# Patient Record
Sex: MALE | Race: WHITE | NOT HISPANIC OR LATINO | Employment: FULL TIME | URBAN - METROPOLITAN AREA
[De-identification: names, ages, dates, MRNs, and addresses within clinical notes are randomized per-mention and may not be internally consistent; named-entity substitution may affect disease eponyms.]

---

## 2017-01-30 ENCOUNTER — HOSPITAL ENCOUNTER (OUTPATIENT)
Dept: LAB | Facility: MEDICAL CENTER | Age: 47
End: 2017-01-30
Attending: PHYSICIAN ASSISTANT
Payer: COMMERCIAL

## 2017-01-30 DIAGNOSIS — I10 ESSENTIAL HYPERTENSION: ICD-10-CM

## 2017-01-30 DIAGNOSIS — E03.9 HYPOTHYROIDISM, UNSPECIFIED TYPE: ICD-10-CM

## 2017-01-30 DIAGNOSIS — Z13.220 SCREENING FOR HYPERLIPIDEMIA: ICD-10-CM

## 2017-01-30 LAB
ALBUMIN SERPL BCP-MCNC: 4.3 G/DL (ref 3.2–4.9)
ALBUMIN/GLOB SERPL: 1.5 G/DL
ALP SERPL-CCNC: 49 U/L (ref 30–99)
ALT SERPL-CCNC: 18 U/L (ref 2–50)
ANION GAP SERPL CALC-SCNC: 7 MMOL/L (ref 0–11.9)
AST SERPL-CCNC: 21 U/L (ref 12–45)
BASOPHILS # BLD AUTO: 0.03 K/UL (ref 0–0.12)
BASOPHILS NFR BLD AUTO: 0.6 % (ref 0–1.8)
BILIRUB SERPL-MCNC: 1.3 MG/DL (ref 0.1–1.5)
BUN SERPL-MCNC: 21 MG/DL (ref 8–22)
CALCIUM SERPL-MCNC: 9.4 MG/DL (ref 8.5–10.5)
CHLORIDE SERPL-SCNC: 104 MMOL/L (ref 96–112)
CHOLEST SERPL-MCNC: 262 MG/DL (ref 100–199)
CO2 SERPL-SCNC: 26 MMOL/L (ref 20–33)
CREAT SERPL-MCNC: 1.28 MG/DL (ref 0.5–1.4)
EOSINOPHIL # BLD: 0.1 K/UL (ref 0–0.51)
EOSINOPHIL NFR BLD AUTO: 1.9 % (ref 0–6.9)
ERYTHROCYTE [DISTWIDTH] IN BLOOD BY AUTOMATED COUNT: 41.3 FL (ref 35.9–50)
GLOBULIN SER CALC-MCNC: 2.9 G/DL (ref 1.9–3.5)
GLUCOSE SERPL-MCNC: 94 MG/DL (ref 65–99)
HCT VFR BLD AUTO: 44.3 % (ref 42–52)
HDLC SERPL-MCNC: 62 MG/DL
HGB BLD-MCNC: 14.7 G/DL (ref 14–18)
IMM GRANULOCYTES # BLD AUTO: 0.01 K/UL (ref 0–0.11)
IMM GRANULOCYTES NFR BLD AUTO: 0.2 % (ref 0–0.9)
LDLC SERPL CALC-MCNC: 177 MG/DL
LYMPHOCYTES # BLD: 1.99 K/UL (ref 1–4.8)
LYMPHOCYTES NFR BLD AUTO: 37.8 % (ref 22–41)
MCH RBC QN AUTO: 31.4 PG (ref 27–33)
MCHC RBC AUTO-ENTMCNC: 33.2 G/DL (ref 33.7–35.3)
MCV RBC AUTO: 94.7 FL (ref 81.4–97.8)
MONOCYTES # BLD: 0.44 K/UL (ref 0–0.85)
MONOCYTES NFR BLD AUTO: 8.3 % (ref 0–13.4)
NEUTROPHILS # BLD: 2.7 K/UL (ref 1.82–7.42)
NEUTROPHILS NFR BLD AUTO: 51.2 % (ref 44–72)
NRBC # BLD AUTO: 0 K/UL
NRBC BLD-RTO: 0 /100 WBC
PLATELET # BLD AUTO: 225 K/UL (ref 164–446)
PMV BLD AUTO: 12 FL (ref 9–12.9)
POTASSIUM SERPL-SCNC: 4.1 MMOL/L (ref 3.6–5.5)
PROT SERPL-MCNC: 7.2 G/DL (ref 6–8.2)
RBC # BLD AUTO: 4.68 M/UL (ref 4.7–6.1)
SODIUM SERPL-SCNC: 137 MMOL/L (ref 135–145)
T4 FREE SERPL-MCNC: 0.92 NG/DL (ref 0.53–1.43)
TRIGL SERPL-MCNC: 113 MG/DL (ref 0–149)
TSH SERPL DL<=0.005 MIU/L-ACNC: 5.32 UIU/ML (ref 0.3–3.7)
WBC # BLD AUTO: 5.3 K/UL (ref 4.8–10.8)

## 2017-01-30 PROCEDURE — 36415 COLL VENOUS BLD VENIPUNCTURE: CPT

## 2017-01-30 PROCEDURE — 84443 ASSAY THYROID STIM HORMONE: CPT

## 2017-01-30 PROCEDURE — 80061 LIPID PANEL: CPT

## 2017-01-30 PROCEDURE — 84439 ASSAY OF FREE THYROXINE: CPT

## 2017-01-30 PROCEDURE — 80053 COMPREHEN METABOLIC PANEL: CPT

## 2017-01-30 PROCEDURE — 85025 COMPLETE CBC W/AUTO DIFF WBC: CPT

## 2017-01-31 ENCOUNTER — TELEPHONE (OUTPATIENT)
Dept: MEDICAL GROUP | Facility: MEDICAL CENTER | Age: 47
End: 2017-01-31

## 2017-01-31 NOTE — TELEPHONE ENCOUNTER
----- Message from Naty Trejo PA-C sent at 1/31/2017  6:47 AM PST -----  Please ask pt to make a non-urgent appt to discuss labs and to f/u on his blood pressure.   (long appt- we will discuss cholesterol, thyroid, HTN, and CBC)    Thank you  Naty

## 2017-02-02 ENCOUNTER — PATIENT MESSAGE (OUTPATIENT)
Dept: MEDICAL GROUP | Facility: MEDICAL CENTER | Age: 47
End: 2017-02-02

## 2017-02-02 DIAGNOSIS — E03.9 HYPOTHYROIDISM, UNSPECIFIED TYPE: ICD-10-CM

## 2017-02-02 DIAGNOSIS — I10 ESSENTIAL HYPERTENSION: ICD-10-CM

## 2017-02-03 RX ORDER — LISINOPRIL AND HYDROCHLOROTHIAZIDE 12.5; 1 MG/1; MG/1
0.5 TABLET ORAL DAILY
Qty: 45 TAB | Refills: 1 | Status: SHIPPED | OUTPATIENT
Start: 2017-02-03 | End: 2017-08-04 | Stop reason: SDUPTHER

## 2017-02-03 RX ORDER — LEVOTHYROXINE SODIUM 88 UG/1
88 TABLET ORAL
Qty: 90 TAB | Refills: 1 | Status: SHIPPED | OUTPATIENT
Start: 2017-02-03 | End: 2017-08-04 | Stop reason: SDUPTHER

## 2017-02-06 ENCOUNTER — OFFICE VISIT (OUTPATIENT)
Dept: MEDICAL GROUP | Facility: MEDICAL CENTER | Age: 47
End: 2017-02-06
Payer: COMMERCIAL

## 2017-02-06 VITALS
TEMPERATURE: 97.9 F | OXYGEN SATURATION: 97 % | DIASTOLIC BLOOD PRESSURE: 82 MMHG | SYSTOLIC BLOOD PRESSURE: 124 MMHG | WEIGHT: 232.37 LBS | RESPIRATION RATE: 16 BRPM | BODY MASS INDEX: 30.8 KG/M2 | HEART RATE: 62 BPM | HEIGHT: 73 IN

## 2017-02-06 DIAGNOSIS — E03.9 HYPOTHYROIDISM, UNSPECIFIED TYPE: ICD-10-CM

## 2017-02-06 DIAGNOSIS — E66.9 OBESITY (BMI 30-39.9): ICD-10-CM

## 2017-02-06 DIAGNOSIS — E78.5 HYPERLIPIDEMIA, UNSPECIFIED HYPERLIPIDEMIA TYPE: ICD-10-CM

## 2017-02-06 PROCEDURE — 99214 OFFICE O/P EST MOD 30 MIN: CPT | Performed by: PHYSICIAN ASSISTANT

## 2017-02-06 RX ORDER — SIMVASTATIN 10 MG
10 TABLET ORAL EVERY EVENING
Qty: 30 TAB | Refills: 3 | Status: SHIPPED | OUTPATIENT
Start: 2017-02-06 | End: 2017-06-03 | Stop reason: SDUPTHER

## 2017-02-06 ASSESSMENT — PAIN SCALES - GENERAL: PAINLEVEL: NO PAIN

## 2017-02-06 NOTE — PROGRESS NOTES
Subjective:     Chief Complaint   Patient presents with   • Follow-Up     labs     Dannie Boggs is a 46 y.o. male here today for hypothyroidism, cholesterol issues as listed below    Hypothyroidism  Dx around 10 years.   Treating with levothyroxine 88mcg qd  Results for DANNIE BOGGS (MRN [id number]) as of 2/6/2017 08:36   Ref. Range 1/30/2017 06:35   TSH Latest Ref Range: 0.300-3.700 uIU/mL 5.320 (H)   Free T-4 Latest Ref Range: 0.53-1.43 ng/dL 0.92   Has a veggie heavy diet- typically has looser stools.   Denies diarrhea, constipation, intolerance to hot/cold, rash, palpitation.    Hyperlipidemia  Has been taking simvastatin and past.    Results for DANNIE BOGGS (MRN [id number]) as of 2/6/2017 08:36   Ref. Range 1/30/2017 06:35   Cholesterol,Tot Latest Ref Range: 100-199 mg/dL 262 (H)   Triglycerides Latest Ref Range: 0-149 mg/dL 113   HDL Latest Ref Range: >=40 mg/dL 62   LDL Latest Ref Range: <100 mg/dL 177 (H)   Although is doing really well on diet and exercise and longer needed this.  Stated he still has a very healthy diet mostly vegetarian, hamburger maybe once monthly, never goes out to eat.  Exercises 5 days weekly including cardio  No tobacco use  Rare EtOH use  Family history of hyperlipidemia  Denies chest pain, shortness of breath, lightheadedness, muscle cramping       Current medicines (including changes today)  Current Outpatient Prescriptions   Medication Sig Dispense Refill   • simvastatin (ZOCOR) 10 MG Tab Take 1 Tab by mouth every evening. 30 Tab 3   • lisinopril-hydrochlorothiazide (PRINZIDE, ZESTORETIC) 10-12.5 MG per tablet Take 0.5 Tabs by mouth every day. 45 Tab 1   • levothyroxine (SYNTHROID) 88 MCG Tab Take 1 Tab by mouth Every morning on an empty stomach. 90 Tab 1     No current facility-administered medications for this visit.     He  has no past medical history on file.    ROS   No chest pain, no shortness of breath, no abdominal pain       Objective:     Blood  "pressure 124/82, pulse 62, temperature 36.6 °C (97.9 °F), resp. rate 16, height 1.854 m (6' 1\"), weight 105.4 kg (232 lb 5.8 oz), SpO2 97 %. Body mass index is 30.66 kg/(m^2).   Physical Exam:  Alert, oriented in no acute distress.  Eye contact is good, speech goal directed, affect calm  HEENT: conjunctiva non-injected, sclera non-icteric, PERRL.  Neck No adenopathy or masses in the neck or supraclavicular regions.  Lungs: clear to auscultation bilaterally with good excursion.  CV: regular rate and rhythm.  Ext: no edema, color normal, peripheral pulses 2+, temperature normal        Assessment and Plan:   The following treatment plan was discussed     1. Hypothyroidism, unspecified type  Subclinical hypothyroidism, will recheck at 3 month labs, we'll continue with levothyroxine 88 µg daily  - TSH; Future  - FREE THYROXINE; Future    2. Hyperlipidemia, unspecified hyperlipidemia type  New diagnosis, Uncontrolled, even with his great HDL is ratio is still over 4%.   Discussed diet, he has a couple changes he can make, continue with exercise 5 days weekly including cardio. Does have a lot of stress going on, discussed working on controlling stress and anxiety  - simvastatin (ZOCOR) 10 MG Tab; Take 1 Tab by mouth every evening.  Dispense: 30 Tab; Refill: 3  - LIPID PROFILE; Future    3. Obesity (BMI 30-39.9)  See #2  - Patient identified as having weight management issue.  Appropriate orders and counseling given.     Followup: Return in about 3 months (around 5/6/2017) for thyroid and cholesterol.           Please note that this dictation was created using voice recognition software. I have made every reasonable attempt to correct obvious errors, but I expect that there are errors of grammar and possibly content that I did not discover before finalizing the note.  "

## 2017-02-06 NOTE — ASSESSMENT & PLAN NOTE
Has been taking simvastatin and past.    Results for DANNIE CHANEY (MRN [id number]) as of 2/6/2017 08:36   Ref. Range 1/30/2017 06:35   Cholesterol,Tot Latest Ref Range: 100-199 mg/dL 262 (H)   Triglycerides Latest Ref Range: 0-149 mg/dL 113   HDL Latest Ref Range: >=40 mg/dL 62   LDL Latest Ref Range: <100 mg/dL 177 (H)   Although is doing really well on diet and exercise and longer needed this.  Stated he still has a very healthy diet mostly vegetarian, hamburger maybe once monthly, never goes out to eat.  Exercises 5 days weekly including cardio  No tobacco use  Rare EtOH use  Family history of hyperlipidemia  Denies chest pain, shortness of breath, lightheadedness, muscle cramping

## 2017-02-06 NOTE — ASSESSMENT & PLAN NOTE
Dx around 10 years.   Treating with levothyroxine 88mcg qd  Results for DANNIE CHANEY (MRN [id number]) as of 2/6/2017 08:36   Ref. Range 1/30/2017 06:35   TSH Latest Ref Range: 0.300-3.700 uIU/mL 5.320 (H)   Free T-4 Latest Ref Range: 0.53-1.43 ng/dL 0.92   Has a veggie heavy diet- typically has looser stools.   Denies diarrhea, constipation, intolerance to hot/cold, rash, palpitation.

## 2017-02-06 NOTE — MR AVS SNAPSHOT
"        Matt Silverman [place]   2017 8:00 AM   Office Visit   MRN: [id number]    Department:  [place] [address]   Dept Phone:  [phone]    Description:  Male : 1970   Provider:  Naty Trejo PA-C           Reason for Visit     Follow-Up labs      Allergies as of 2017     No Known Allergies      You were diagnosed with     Hypothyroidism, unspecified type   [8292343]       Hyperlipidemia, unspecified hyperlipidemia type   [7214303]         Vital Signs     Blood Pressure Pulse Temperature Respirations Height Weight    124/82 mmHg 62 36.6 °C (97.9 °F) 16 1.854 m (6' 1\") 105.4 kg (232 lb 5.8 oz)    Body Mass Index Oxygen Saturation Smoking Status             30.66 kg/m2 97% Never Smoker          Basic Information     Date Of Birth Sex Race Ethnicity Preferred Language    1970 Male White Non- English      Your appointments     May 08, 2017  7:40 AM   Established Patient with Naty Trejo PA-C   [facility]    [address]  [address]  [place] [id number] [phone]           You will be receiving a confirmation call a few days before your appointment from our automated call confirmation system.              Problem List              ICD-10-CM Priority Class Noted - Resolved    Hypothyroidism E03.9   10/19/2016 - Present    Essential hypertension I10   10/19/2016 - Present    Psoriasis of nail L40.9   10/19/2016 - Present    Hyperlipidemia E78.5   2017 - Present      Health Maintenance        Date Due Completion Dates    IMM DTaP/Tdap/Td Vaccine (1 - Tdap) 7/15/1988 ---    IMM INFLUENZA (1) 2016 ---            Current Immunizations     No immunizations on file.      Below and/or attached are the medications your provider expects you to take. Review all of your home medications and newly ordered medications with your provider and/or pharmacist. Follow medication instructions as directed by your provider and/or pharmacist. " Please keep your medication list with you and share with your provider. Update the information when medications are discontinued, doses are changed, or new medications (including over-the-counter products) are added; and carry medication information at all times in the event of emergency situations     Allergies:  No Known Allergies          Medications  Valid as of: February 06, 2017 -  8:37 AM    Generic Name Brand Name Tablet Size Instructions for use    Levothyroxine Sodium (Tab) SYNTHROID 88 MCG Take 1 Tab by mouth Every morning on an empty stomach.        Lisinopril-Hydrochlorothiazide (Tab) PRINZIDE, ZESTORETIC 10-12.5 MG Take 0.5 Tabs by mouth every day.        Simvastatin (Tab) ZOCOR 10 MG Take 1 Tab by mouth every evening.        .                 Medicines prescribed today were sent to:     [facility]/PHARMACY #[id number] - [place] - [address]    [address] [place] [address]    Phone: [phone] Fax: [phone]    Open 24 Hours?: No      Medication refill instructions:       If your prescription bottle indicates you have medication refills left, it is not necessary to call your provider’s office. Please contact your pharmacy and they will refill your medication.    If your prescription bottle indicates you do not have any refills left, you may request refills at any time through one of the following ways: The online [employer] system (except Urgent Care), by calling your provider’s office, or by asking your pharmacy to contact your provider’s office with a refill request. Medication refills are processed only during regular business hours and may not be available until the next business day. Your provider may request additional information or to have a follow-up visit with you prior to refilling your medication.   *Please Note: Medication refills are assigned a new Rx number when refilled electronically. Your pharmacy may indicate that no refills were authorized even though a new prescription for the  same medication is available at the pharmacy. Please request the medicine by name with the pharmacy before contacting your provider for a refill.        Your To Do List     Future Labs/Procedures Complete By Expires    FREE THYROXINE  As directed 2/7/2018    LIPID PROFILE  As directed 2/7/2018    TSH  As directed 2/7/2018         MyChart Access Code: Activation code not generated  Current [employer] Status: Active

## 2017-05-08 ENCOUNTER — APPOINTMENT (OUTPATIENT)
Dept: MEDICAL GROUP | Facility: MEDICAL CENTER | Age: 47
End: 2017-05-08
Payer: COMMERCIAL

## 2017-07-11 ENCOUNTER — HOSPITAL ENCOUNTER (OUTPATIENT)
Dept: LAB | Facility: MEDICAL CENTER | Age: 47
End: 2017-07-11
Attending: PHYSICIAN ASSISTANT
Payer: COMMERCIAL

## 2017-07-11 DIAGNOSIS — E78.5 HYPERLIPIDEMIA, UNSPECIFIED HYPERLIPIDEMIA TYPE: ICD-10-CM

## 2017-07-11 DIAGNOSIS — E03.9 HYPOTHYROIDISM, UNSPECIFIED TYPE: ICD-10-CM

## 2017-07-11 LAB
CHOLEST SERPL-MCNC: 198 MG/DL (ref 100–199)
HDLC SERPL-MCNC: 59 MG/DL
LDLC SERPL CALC-MCNC: 118 MG/DL
T4 FREE SERPL-MCNC: 0.81 NG/DL (ref 0.53–1.43)
TRIGL SERPL-MCNC: 105 MG/DL (ref 0–149)
TSH SERPL DL<=0.005 MIU/L-ACNC: 4.36 UIU/ML (ref 0.3–3.7)

## 2017-07-11 PROCEDURE — 80061 LIPID PANEL: CPT

## 2017-07-11 PROCEDURE — 36415 COLL VENOUS BLD VENIPUNCTURE: CPT

## 2017-07-11 PROCEDURE — 84439 ASSAY OF FREE THYROXINE: CPT

## 2017-07-11 PROCEDURE — 84443 ASSAY THYROID STIM HORMONE: CPT

## 2017-07-12 ENCOUNTER — TELEPHONE (OUTPATIENT)
Dept: MEDICAL GROUP | Facility: MEDICAL CENTER | Age: 47
End: 2017-07-12

## 2017-07-12 NOTE — TELEPHONE ENCOUNTER
Phone Number Called: [phone] (home)     Message: lvm to return call to review labs    Left Message for patient to call back: yes

## 2017-07-12 NOTE — TELEPHONE ENCOUNTER
----- Message from Naty Trejo PA-C sent at 7/12/2017  9:08 AM PDT -----  Please inform pt that his cholesterol has improved but is still slightly high so to continue healthy life style changes.   Also his thyroid is still slightly hypothyroidism but better than 5 mo ago.   We will continue to monitor that unless pt start getting a lot of hypothyroid symptoms.   Thank you  Naty

## 2017-07-14 NOTE — TELEPHONE ENCOUNTER
Phone Number Called: [phone] (home)       Message: Patient informed of msg.     Left Message for patient to call back: yes

## 2017-07-14 NOTE — TELEPHONE ENCOUNTER
Phone Number Called: [phone] (home)     Message: Left msg for patient to call back.    Left Message for patient to call back: yes

## 2017-08-04 RX ORDER — LISINOPRIL AND HYDROCHLOROTHIAZIDE 12.5; 1 MG/1; MG/1
TABLET ORAL
Qty: 45 TAB | Refills: 0 | Status: SHIPPED | OUTPATIENT
Start: 2017-08-04 | End: 2017-10-31 | Stop reason: SDUPTHER

## 2017-08-04 RX ORDER — LEVOTHYROXINE SODIUM 88 UG/1
TABLET ORAL
Qty: 90 TAB | Refills: 0 | Status: SHIPPED | OUTPATIENT
Start: 2017-08-04 | End: 2017-10-31 | Stop reason: SDUPTHER

## 2017-08-05 ENCOUNTER — OFFICE VISIT (OUTPATIENT)
Dept: URGENT CARE | Facility: CLINIC | Age: 47
End: 2017-08-05
Payer: COMMERCIAL

## 2017-08-05 VITALS
OXYGEN SATURATION: 97 % | WEIGHT: 225.5 LBS | TEMPERATURE: 97.5 F | BODY MASS INDEX: 29.88 KG/M2 | HEIGHT: 73 IN | HEART RATE: 79 BPM | SYSTOLIC BLOOD PRESSURE: 140 MMHG | RESPIRATION RATE: 16 BRPM | DIASTOLIC BLOOD PRESSURE: 88 MMHG

## 2017-08-05 DIAGNOSIS — J98.8 RTI (RESPIRATORY TRACT INFECTION): ICD-10-CM

## 2017-08-05 DIAGNOSIS — J32.9 RHINOSINUSITIS: ICD-10-CM

## 2017-08-05 PROCEDURE — 99214 OFFICE O/P EST MOD 30 MIN: CPT | Performed by: FAMILY MEDICINE

## 2017-08-05 RX ORDER — PREDNISONE 20 MG/1
40 TABLET ORAL EVERY MORNING
Qty: 12 TAB | Refills: 0 | Status: SHIPPED | OUTPATIENT
Start: 2017-08-05 | End: 2017-08-11

## 2017-08-05 RX ORDER — AMOXICILLIN AND CLAVULANATE POTASSIUM 875; 125 MG/1; MG/1
1 TABLET, FILM COATED ORAL 2 TIMES DAILY
Qty: 14 TAB | Refills: 0 | Status: SHIPPED | OUTPATIENT
Start: 2017-08-05 | End: 2017-08-12

## 2017-08-05 ASSESSMENT — ENCOUNTER SYMPTOMS
SPUTUM PRODUCTION: 0
FEVER: 0
ORTHOPNEA: 0
CHILLS: 0
COUGH: 1
FOCAL WEAKNESS: 0
DIZZINESS: 0
SORE THROAT: 1

## 2017-08-05 NOTE — PROGRESS NOTES
"Subjective:      Matt Boggs is a 47 y.o. male who presents with Cough    Chief Complaint   Patient presents with   • Cough     cold symptoms and night sweats.        - This is a very pleasant 47 y.o. male with complaints of cough w/ sinus congestion and DC x 2wks. Not getting better w/ otc meds. No NVFC          ALLERGIES:  Review of patient's allergies indicates no known allergies.     PMH:  No past medical history on file.     MEDS:    Current outpatient prescriptions:   •  amoxicillin-clavulanate (AUGMENTIN) 875-125 MG Tab, Take 1 Tab by mouth 2 times a day for 7 days., Disp: 14 Tab, Rfl: 0  •  predniSONE (DELTASONE) 20 MG Tab, Take 2 Tabs by mouth every morning for 6 days., Disp: 12 Tab, Rfl: 0  •  prometh-phenylephrine-codeine (PHENERGAN VC CODEINE) 5-6.25-10 MG/5ML Syrup, Take 5 mL by mouth every 8 hours as needed., Disp: 280 mL, Rfl: 0  •  levothyroxine (SYNTHROID) 88 MCG Tab, TAKE 1 TAB BY MOUTH EVERY MORNING ON AN EMPTY STOMACH., Disp: 90 Tab, Rfl: 0  •  lisinopril-hydrochlorothiazide (PRINZIDE, ZESTORETIC) 10-12.5 MG per tablet, TAKE 0.5 TABS BY MOUTH EVERY DAY., Disp: 45 Tab, Rfl: 0  •  simvastatin (ZOCOR) 10 MG Tab, TAKE 1 TAB BY MOUTH EVERY EVENING., Disp: 30 Tab, Rfl: 3    ** I have documented what I find to be significant in regards to past medical, social, family and surgical history  in my HPI or under PMH/PSH/FH review section, otherwise it is contributory **             HPI    Review of Systems   Constitutional: Negative for fever and chills.   HENT: Positive for congestion and sore throat.    Respiratory: Positive for cough. Negative for sputum production.    Cardiovascular: Negative for chest pain and orthopnea.   Neurological: Negative for dizziness and focal weakness.          Objective:     /88 mmHg  Pulse 79  Temp(Src) 36.4 °C (97.5 °F)  Resp 16  Ht 1.854 m (6' 0.99\")  Wt 102.286 kg (225 lb 8 oz)  BMI 29.76 kg/m2  SpO2 97%     Physical Exam   Constitutional: He " appears well-developed. No distress.   HENT:   Head: Normocephalic and atraumatic.   Mouth/Throat: Oropharynx is clear and moist.   Eyes: Conjunctivae are normal.   Neck: Neck supple.   Cardiovascular: Regular rhythm.    No murmur heard.  Pulmonary/Chest: Effort normal and breath sounds normal. No respiratory distress.   Neurological: He is alert. He exhibits normal muscle tone.   Skin: Skin is warm and dry.   Psychiatric: He has a normal mood and affect. Judgment normal.   Nursing note and vitals reviewed.              Assessment/Plan:         1. RTI (respiratory tract infection)  amoxicillin-clavulanate (AUGMENTIN) 875-125 MG Tab    predniSONE (DELTASONE) 20 MG Tab    prometh-phenylephrine-codeine (PHENERGAN VC CODEINE) 5-6.25-10 MG/5ML Syrup   2. Rhinosinusitis               Dx & d/c instructions discussed w/ patient and/or family members. Follow up w/ Prvt Dr or here in 3-4 days if not getting better, sooner if needed,  ER if worse and UC/PCP unavailable.        Possible side effects (i.e. Rash, GI upset/constipation, sedation, elevation of BP or sugars) of any medications given discussed.

## 2017-08-05 NOTE — MR AVS SNAPSHOT
"        Matt Silverman [place]   2017 11:30 AM   Office Visit   MRN: [id number]    Department:  [facility]   Dept Phone:  [phone]    Description:  Male : 1970   Provider:  Shon Ybarra M.D.           Reason for Visit     Cough cold symptoms and night sweats.      Allergies as of 2017     No Known Allergies      You were diagnosed with     RTI (respiratory tract infection)   [338901]       Rhinosinusitis   [[id number]]         Vital Signs     Blood Pressure Pulse Temperature Respirations Height Weight    140/88 mmHg 79 36.4 °C (97.5 °F) 16 1.854 m (6' 0.99\") 102.286 kg (225 lb 8 oz)    Body Mass Index Oxygen Saturation Smoking Status             29.76 kg/m2 97% Never Smoker          Basic Information     Date Of Birth Sex Race Ethnicity Preferred Language    1970 Male White Non- English      Problem List              ICD-10-CM Priority Class Noted - Resolved    Hypothyroidism E03.9   10/19/2016 - Present    Essential hypertension I10   10/19/2016 - Present    Psoriasis of nail L40.9   10/19/2016 - Present    Hyperlipidemia E78.5   2017 - Present    Obesity (BMI 30-39.9) E66.9   2017 - Present      Health Maintenance        Date Due Completion Dates    IMM DTaP/Tdap/Td Vaccine (1 - Tdap) 7/15/1988 ---    IMM INFLUENZA (1) 2017 ---            Current Immunizations     No immunizations on file.      Below and/or attached are the medications your provider expects you to take. Review all of your home medications and newly ordered medications with your provider and/or pharmacist. Follow medication instructions as directed by your provider and/or pharmacist. Please keep your medication list with you and share with your provider. Update the information when medications are discontinued, doses are changed, or new medications (including over-the-counter products) are added; and carry medication information at all times in the event of emergency situations     Allergies:  No " Known Allergies          Medications  Valid as of: August 05, 2017 -  3:39 PM    Generic Name Brand Name Tablet Size Instructions for use    Amoxicillin-Pot Clavulanate (Tab) AUGMENTIN 875-125 MG Take 1 Tab by mouth 2 times a day for 7 days.        Levothyroxine Sodium (Tab) SYNTHROID 88 MCG TAKE 1 TAB BY MOUTH EVERY MORNING ON AN EMPTY STOMACH.        Lisinopril-Hydrochlorothiazide (Tab) PRINZIDE, ZESTORETIC 10-12.5 MG TAKE 0.5 TABS BY MOUTH EVERY DAY.        Phenyleph-Promethazine-Cod (Syrup) PHENERGAN VC CODEINE 5-6.25-10 MG/5ML Take 5 mL by mouth every 8 hours as needed.        PredniSONE (Tab) DELTASONE 20 MG Take 2 Tabs by mouth every morning for 6 days.        Simvastatin (Tab) ZOCOR 10 MG TAKE 1 TAB BY MOUTH EVERY EVENING.        .                 Medicines prescribed today were sent to:     [facility] #[id number] - [place], [place] [address]    [address] [place] [address]    Phone: [phone] Fax: [phone]    Open 24 Hours?: No      Medication refill instructions:       If your prescription bottle indicates you have medication refills left, it is not necessary to call your provider’s office. Please contact your pharmacy and they will refill your medication.    If your prescription bottle indicates you do not have any refills left, you may request refills at any time through one of the following ways: The online [employer] system (except Urgent Care), by calling your provider’s office, or by asking your pharmacy to contact your provider’s office with a refill request. Medication refills are processed only during regular business hours and may not be available until the next business day. Your provider may request additional information or to have a follow-up visit with you prior to refilling your medication.   *Please Note: Medication refills are assigned a new Rx number when refilled electronically. Your pharmacy may indicate that no refills were authorized even though a new prescription for the  same medication is available at the pharmacy. Please request the medicine by name with the pharmacy before contacting your provider for a refill.           MyChart Access Code: Activation code not generated  Current MyChart Status: Active

## 2017-10-03 ENCOUNTER — OFFICE VISIT (OUTPATIENT)
Dept: URGENT CARE | Facility: CLINIC | Age: 47
End: 2017-10-03
Payer: COMMERCIAL

## 2017-10-03 VITALS
RESPIRATION RATE: 20 BRPM | SYSTOLIC BLOOD PRESSURE: 120 MMHG | HEART RATE: 78 BPM | BODY MASS INDEX: 30.22 KG/M2 | HEIGHT: 73 IN | DIASTOLIC BLOOD PRESSURE: 78 MMHG | TEMPERATURE: 97.5 F | WEIGHT: 228 LBS | OXYGEN SATURATION: 98 %

## 2017-10-03 DIAGNOSIS — H10.33 ACUTE BACTERIAL CONJUNCTIVITIS OF BOTH EYES: ICD-10-CM

## 2017-10-03 PROCEDURE — 99214 OFFICE O/P EST MOD 30 MIN: CPT | Performed by: NURSE PRACTITIONER

## 2017-10-03 RX ORDER — TOBRAMYCIN 3 MG/ML
1 SOLUTION/ DROPS OPHTHALMIC 4 TIMES DAILY
Qty: 1 BOTTLE | Refills: 0 | Status: SHIPPED | OUTPATIENT
Start: 2017-10-03 | End: 2018-02-22

## 2017-10-03 ASSESSMENT — ENCOUNTER SYMPTOMS
BLURRED VISION: 0
CHILLS: 0
EYE DISCHARGE: 1
FEVER: 0
WEAKNESS: 0
WEIGHT LOSS: 0
SORE THROAT: 0
PHOTOPHOBIA: 1
EYE PAIN: 0
HEADACHES: 0
EYE REDNESS: 1
DOUBLE VISION: 0
MYALGIAS: 0

## 2017-10-03 NOTE — PROGRESS NOTES
"Subjective:      Matt Boggs is a 47 y.o. male who presents with Eye Problem (Couple days eye itching )            HPI  Matt is a 47 year old male who is here for bilat eye irritation x 2 days. States wears contact lens. Has been wearing eye glasses. States eyes are red, dry/crusty and \"gritty\", had white d/c this AM. Unknown exposure to pink eye. States had recent URI symptoms but have improved. Denies touching face and rubbing eyes. Denies vision change or eye pain. Going out of town tomorrow.    PMH:  has no past medical history on file.  MEDS:   Current Outpatient Prescriptions:   •  tobramycin (TOBREX) 0.3 % Solution ophthalmic solution, Place 1 Drop in both eyes 4 times a day., Disp: 1 Bottle, Rfl: 0  •  levothyroxine (SYNTHROID) 88 MCG Tab, TAKE 1 TAB BY MOUTH EVERY MORNING ON AN EMPTY STOMACH., Disp: 90 Tab, Rfl: 0  •  lisinopril-hydrochlorothiazide (PRINZIDE, ZESTORETIC) 10-12.5 MG per tablet, TAKE 0.5 TABS BY MOUTH EVERY DAY., Disp: 45 Tab, Rfl: 0  •  simvastatin (ZOCOR) 10 MG Tab, TAKE 1 TAB BY MOUTH EVERY EVENING., Disp: 30 Tab, Rfl: 3  •  prometh-phenylephrine-codeine (PHENERGAN VC CODEINE) 5-6.25-10 MG/5ML Syrup, Take 5 mL by mouth every 8 hours as needed., Disp: 280 mL, Rfl: 0  ALLERGIES: No Known Allergies  SURGHX:   Past Surgical History:   Procedure Laterality Date   • ANAL FISTULECTOMY      in his 20's     SOCHX:  reports that he has never smoked. He has never used smokeless tobacco. He reports that he drinks alcohol. He reports that he does not use drugs.  FH: Family history was reviewed, no pertinent findings to report    Review of Systems   Constitutional: Negative for chills, fever, malaise/fatigue and weight loss.   HENT: Negative for congestion, ear pain and sore throat.    Eyes: Positive for photophobia, discharge and redness. Negative for blurred vision, double vision and pain.   Musculoskeletal: Negative for myalgias.   Neurological: Negative for weakness and headaches. " "  Endo/Heme/Allergies: Negative for environmental allergies.   All other systems reviewed and are negative.         Objective:     /78   Pulse 78   Temp 36.4 °C (97.5 °F)   Resp 20   Ht 1.854 m (6' 1\")   Wt 103.4 kg (228 lb)   SpO2 98%   BMI 30.08 kg/m²      Physical Exam   Constitutional: He is oriented to person, place, and time. Vital signs are normal. He appears well-developed and well-nourished. He is active and cooperative.  Non-toxic appearance. He does not have a sickly appearance. He does not appear ill. No distress.   HENT:   Head: Normocephalic.   Eyes: EOM and lids are normal. Pupils are equal, round, and reactive to light. Right eye exhibits no chemosis, no discharge, no exudate and no hordeolum. No foreign body present in the right eye. Left eye exhibits no chemosis, no discharge, no exudate and no hordeolum. No foreign body present in the left eye. Right conjunctiva is injected. Right conjunctiva has no hemorrhage. Left conjunctiva is injected. Left conjunctiva has no hemorrhage.   Neck: Normal range of motion. Neck supple.   Cardiovascular: Normal rate and regular rhythm.    Pulmonary/Chest: Effort normal and breath sounds normal.   Musculoskeletal: Normal range of motion.   Neurological: He is alert and oriented to person, place, and time.   Skin: Skin is warm and dry. He is not diaphoretic.   Vitals reviewed.              Assessment/Plan:     1. Acute bacterial conjunctivitis of both eyes    - tobramycin (TOBREX) 0.3 % Solution ophthalmic solution; Place 1 Drop in both eyes 4 times a day.  Dispense: 1 Bottle; Refill: 0    Spoke about possibility for symptoms for allergy eyes if no improvement after 3 days with redness, irritation and dryness, may try OTC allergy medication (Zaditor or Alaway)  May use longer acting antihistamine prn for allergy symptoms  May use cool compresses for eye swelling  Avoid touching eyes  May clean eyes with mild dilute soap along eyelash line with eyes " closed, rinse with plenty of water  Avoid wearing contact lens under eye redness/irritation improves  Monitor for increase in redness or swelling, vision change, eye pain- need re-evaluation

## 2017-10-16 DIAGNOSIS — E78.5 HYPERLIPIDEMIA, UNSPECIFIED HYPERLIPIDEMIA TYPE: ICD-10-CM

## 2017-10-16 RX ORDER — SIMVASTATIN 10 MG
TABLET ORAL
Qty: 30 TAB | Refills: 3 | Status: SHIPPED | OUTPATIENT
Start: 2017-10-16 | End: 2018-02-03 | Stop reason: SDUPTHER

## 2017-10-16 NOTE — TELEPHONE ENCOUNTER
Was the patient seen in the last year in this department? Yes     Does patient have an active prescription for medications requested? No     Received Request Via: Pharmacy     Last seen  08/05/2017 UC   Labs 07/11/2017

## 2017-10-31 RX ORDER — LEVOTHYROXINE SODIUM 88 UG/1
TABLET ORAL
Qty: 90 TAB | Refills: 0 | Status: SHIPPED | OUTPATIENT
Start: 2017-10-31 | End: 2017-11-21 | Stop reason: SDUPTHER

## 2017-10-31 RX ORDER — LISINOPRIL AND HYDROCHLOROTHIAZIDE 12.5; 1 MG/1; MG/1
TABLET ORAL
Qty: 45 TAB | Refills: 0 | Status: SHIPPED | OUTPATIENT
Start: 2017-10-31 | End: 2017-11-21

## 2017-10-31 NOTE — TELEPHONE ENCOUNTER
Was the patient seen in the last year in this department? Yes     Does patient have an active prescription for medications requested? No     Received Request Via: Pharmacy     Last visit:2/6/17

## 2017-11-21 ENCOUNTER — OFFICE VISIT (OUTPATIENT)
Dept: MEDICAL GROUP | Facility: LAB | Age: 47
End: 2017-11-21
Payer: COMMERCIAL

## 2017-11-21 VITALS
DIASTOLIC BLOOD PRESSURE: 92 MMHG | BODY MASS INDEX: 30.48 KG/M2 | WEIGHT: 230 LBS | OXYGEN SATURATION: 97 % | HEIGHT: 73 IN | HEART RATE: 68 BPM | TEMPERATURE: 98.1 F | SYSTOLIC BLOOD PRESSURE: 142 MMHG

## 2017-11-21 DIAGNOSIS — R63.5 WEIGHT GAIN: ICD-10-CM

## 2017-11-21 DIAGNOSIS — E03.9 HYPOTHYROIDISM, UNSPECIFIED TYPE: ICD-10-CM

## 2017-11-21 DIAGNOSIS — Z23 NEED FOR INFLUENZA VACCINATION: ICD-10-CM

## 2017-11-21 DIAGNOSIS — Z23 NEED FOR TDAP VACCINATION: ICD-10-CM

## 2017-11-21 DIAGNOSIS — I10 ESSENTIAL HYPERTENSION: ICD-10-CM

## 2017-11-21 DIAGNOSIS — E78.5 HYPERLIPIDEMIA, UNSPECIFIED HYPERLIPIDEMIA TYPE: ICD-10-CM

## 2017-11-21 DIAGNOSIS — E66.9 OBESITY (BMI 30-39.9): ICD-10-CM

## 2017-11-21 PROCEDURE — 90472 IMMUNIZATION ADMIN EACH ADD: CPT | Performed by: PHYSICIAN ASSISTANT

## 2017-11-21 PROCEDURE — 99214 OFFICE O/P EST MOD 30 MIN: CPT | Mod: 25 | Performed by: PHYSICIAN ASSISTANT

## 2017-11-21 PROCEDURE — 90471 IMMUNIZATION ADMIN: CPT | Performed by: PHYSICIAN ASSISTANT

## 2017-11-21 PROCEDURE — 90715 TDAP VACCINE 7 YRS/> IM: CPT | Performed by: PHYSICIAN ASSISTANT

## 2017-11-21 PROCEDURE — 90686 IIV4 VACC NO PRSV 0.5 ML IM: CPT | Performed by: PHYSICIAN ASSISTANT

## 2017-11-21 RX ORDER — LEVOTHYROXINE SODIUM 88 UG/1
88 TABLET ORAL
Qty: 20 TAB | Refills: 3 | Status: SHIPPED | OUTPATIENT
Start: 2017-11-21 | End: 2018-03-28 | Stop reason: SDUPTHER

## 2017-11-21 RX ORDER — LISINOPRIL 10 MG/1
10 TABLET ORAL DAILY
Qty: 30 TAB | Refills: 3 | Status: SHIPPED | OUTPATIENT
Start: 2017-11-21 | End: 2018-03-26 | Stop reason: SDUPTHER

## 2017-11-21 RX ORDER — LEVOTHYROXINE SODIUM 0.1 MG/1
100 TABLET ORAL
Qty: 8 TAB | Refills: 3 | Status: SHIPPED | OUTPATIENT
Start: 2017-11-25 | End: 2018-03-26 | Stop reason: SDUPTHER

## 2017-11-21 RX ORDER — LISINOPRIL 10 MG/1
10 TABLET ORAL DAILY
Qty: 30 TAB | Refills: 3 | Status: SHIPPED | OUTPATIENT
Start: 2017-11-21 | End: 2017-11-21 | Stop reason: SDUPTHER

## 2017-11-21 RX ORDER — LEVOTHYROXINE SODIUM 0.1 MG/1
100 TABLET ORAL
Qty: 8 TAB | Refills: 3 | Status: SHIPPED | OUTPATIENT
Start: 2017-11-25 | End: 2017-11-21 | Stop reason: SDUPTHER

## 2017-11-21 ASSESSMENT — PATIENT HEALTH QUESTIONNAIRE - PHQ9: CLINICAL INTERPRETATION OF PHQ2 SCORE: 0

## 2017-11-21 ASSESSMENT — PAIN SCALES - GENERAL: PAINLEVEL: 2=MINIMAL-SLIGHT

## 2017-11-21 NOTE — PROGRESS NOTES
Subjective:     Chief Complaint   Patient presents with   • Follow-Up     labs needed     Matt Boggs is a 47 y.o. male here today for thyroid and HTN as listed below    Hypothyroidism  This is chronic. Pt treating with levothyroxine 88mcg qd  Taking medicine as directed on empty stomach with water and waits at last 30 minutes to consume other food or beverage.   Labwork 7/2017. Has been subclinically hypothyroidism on 7/2017 and 1/2017 labs.   Can't seem to lose his weight.  Working out 6 days weekly.   Diet: very healthy.   Denies palpitations, skin changes, temperature intolerance, changes in bowel habits.  Pt requesting to order testosterone level next set of labs. Has some decrease in labido but mostly just can't lose weight or add more muscle. Feels he has been working out and eating right for over 1 year and hasn't seen the results he feels he should be.     Essential hypertension  Has been out of medication for 2-3 weeks since having trouble with pharmacy.   Was treating with lisinopril- hctz 10-12.5mg 1/2 tab daily.    Hasn't been taking bp reading at home.   Today /92  Denies HA, blurry vision, CP, SOB, dizziness, light headedness, leg swelling  Exercise: boot camp most morning  Diet: low carb, mostly veggies and animal protein.   Denies tobacco use.   fhx both parent have HTN.      Current medicines (including changes today)  Current Outpatient Prescriptions   Medication Sig Dispense Refill   • [START ON 11/25/2017] levothyroxine (SYNTHROID) 100 MCG Tab Take 1 Tab by mouth in the morning every Sat and Sun. 8 Tab 3   • lisinopril (PRINIVIL) 10 MG Tab Take 1 Tab by mouth every day. 30 Tab 3   • levothyroxine (SYNTHROID) 88 MCG Tab TAKE 1 TAB BY MOUTH EVERY MORNING ON AN EMPTY STOMACH. 90 Tab 0   • simvastatin (ZOCOR) 10 MG Tab TAKE 1 TAB BY MOUTH EVERY EVENING. 30 Tab 3   • tobramycin (TOBREX) 0.3 % Solution ophthalmic solution Place 1 Drop in both eyes 4 times a day. 1 Bottle 0   •  "prometh-phenylephrine-codeine (PHENERGAN VC CODEINE) 5-6.25-10 MG/5ML Syrup Take 5 mL by mouth every 8 hours as needed. 280 mL 0     No current facility-administered medications for this visit.      He  has no past medical history on file.    ROS   No chest pain, no shortness of breath, no abdominal pain       Objective:     Blood pressure 142/92, pulse 68, temperature 36.7 °C (98.1 °F), height 1.854 m (6' 1\"), weight 104.3 kg (230 lb), SpO2 97 %. Body mass index is 30.34 kg/m².   Physical Exam:  Alert, oriented in no acute distress.  Eye contact is good, speech goal directed, affect calm  HEENT: conjunctiva non-injected, sclera non-icteric, PERRL.  Neck No adenopathy or masses in the neck or supraclavicular regions.  Lungs: clear to auscultation bilaterally with good excursion.  CV: regular rate and rhythm.  Abdomen: soft, nontender, no HSM, No CVAT  Ext: no edema, color normal, peripheral pulses 2+, temperature normal      Assessment and Plan:   The following treatment plan was discussed     1. Hypothyroidism, unspecified type  Subclically hypothyroidism, has mild sx. We will increase medication slightly. From levothyroxine 88mcg daily to taking 88mcg 5 days weekly and taking 100mcg 2 days weekly.   New script for levothyroxine 100mcg two days weekly given today.   Recheck labs in 3 mo.   - TSH; Future  - FREE THYROXINE; Future    2. Essential hypertension  Uncontrolled, although pt not taking medications.   Discussed what impact uncontrolled htn can have on health.  Since his pharmacy was having trouble since he was taking just 1/2 tab.   He is on such a low dose of the combo medication we will separate this and only prescribe lisinopril 10mg qd.   Stop taking the lisinopril-hctz 10-12.5mg 1/2 tab qd.   Recommend checking BP daily.   F/u 1 mo.  Continue with healthy diet and exercise.   - CBC WITH DIFFERENTIAL; Future  - COMP METABOLIC PANEL; Future    3. Weight gain  See #1 and we will check testosterone per pt " request with his next labs in 3 mo.   - TESTOSTERONE, FREE/TOT EQUILIB    4. Hyperlipidemia, unspecified hyperlipidemia type  Labs ordered for check in 3 mo, will discuss at annual visit in 3 mo.   - COMP METABOLIC PANEL; Future  - LIPID PROFILE; Future    5. Need for influenza vaccination  Flu vaccine given today  - INFLUENZA VACCINE QUAD INJ >3Y(PF)    7. Need for Tdap vaccination  TDAP given today  - Tdap =>6yo IM      Followup: Return in about 4 weeks (around 12/19/2017) for HTN, 3 mo for annual.           Please note that this dictation was created using voice recognition software. I have made every reasonable attempt to correct obvious errors, but I expect that there are errors of grammar and possibly content that I did not discover before finalizing the note.

## 2017-11-21 NOTE — ASSESSMENT & PLAN NOTE
This is chronic. Pt treating with levothyroxine 88mcg qd  Taking medicine as directed on empty stomach with water and waits at last 30 minutes to consume other food or beverage.   Labwork 7/2017. Has been subclinically hypothyroidism on 7/2017 and 1/2017 labs.   Can't seem to lose his weight.  Working out 6 days weekly.   Diet: very healthy.   Denies palpitations, skin changes, temperature intolerance, changes in bowel habits.  Pt requesting to order testosterone level next set of labs. Has some decrease in labido but mostly just can't lose weight or add more muscle. Feels he has been working out and eating right for over 1 year and hasn't seen the results he feels he should be.

## 2017-11-21 NOTE — ASSESSMENT & PLAN NOTE
Has been out of medication for 2-3 weeks since having trouble with pharmacy.   Was treating with lisinopril- hctz 10-12.5mg 1/2 tab daily.    Hasn't been taking bp reading at home.   Today /92  Denies HA, blurry vision, CP, SOB, dizziness, light headedness, leg swelling  Exercise: boot camp most morning  Diet: low carb, mostly veggies and animal protein.   Denies tobacco use.   fhx both parent have HTN.

## 2017-12-21 ENCOUNTER — OFFICE VISIT (OUTPATIENT)
Dept: MEDICAL GROUP | Facility: LAB | Age: 47
End: 2017-12-21
Payer: COMMERCIAL

## 2017-12-21 VITALS
RESPIRATION RATE: 12 BRPM | WEIGHT: 229 LBS | SYSTOLIC BLOOD PRESSURE: 128 MMHG | HEIGHT: 73 IN | TEMPERATURE: 98.6 F | OXYGEN SATURATION: 98 % | DIASTOLIC BLOOD PRESSURE: 80 MMHG | BODY MASS INDEX: 30.35 KG/M2 | HEART RATE: 82 BPM

## 2017-12-21 DIAGNOSIS — I10 ESSENTIAL HYPERTENSION: ICD-10-CM

## 2017-12-21 PROCEDURE — 99214 OFFICE O/P EST MOD 30 MIN: CPT | Performed by: PHYSICIAN ASSISTANT

## 2017-12-21 ASSESSMENT — PAIN SCALES - GENERAL: PAINLEVEL: NO PAIN

## 2017-12-21 NOTE — PATIENT INSTRUCTIONS

## 2017-12-21 NOTE — PROGRESS NOTES
"Subjective:   Matt Boggs is a 47 y.o. male here today for HTN as listed below    Essential hypertension  This is chronic. Stable not that he has restarted lisinorpil 10mg qd.   Has not been monitoring BP at home.   Today /80   Also taking baby aspirin occasionally.   Denies lightheadedness, vision changes, headache, palpitations, chest pain, cough or leg swelling.     Current medicines (including changes today)  Current Outpatient Prescriptions   Medication Sig Dispense Refill   • levothyroxine (SYNTHROID) 100 MCG Tab Take 1 Tab by mouth in the morning every Sat and Sun. 8 Tab 3   • lisinopril (PRINIVIL) 10 MG Tab Take 1 Tab by mouth every day. 30 Tab 3   • levothyroxine (SYNTHROID) 88 MCG Tab Take 1 Tab by mouth every morning Monday through Friday. 20 Tab 3   • simvastatin (ZOCOR) 10 MG Tab TAKE 1 TAB BY MOUTH EVERY EVENING. 30 Tab 3   • tobramycin (TOBREX) 0.3 % Solution ophthalmic solution Place 1 Drop in both eyes 4 times a day. 1 Bottle 0   • prometh-phenylephrine-codeine (PHENERGAN VC CODEINE) 5-6.25-10 MG/5ML Syrup Take 5 mL by mouth every 8 hours as needed. 280 mL 0     No current facility-administered medications for this visit.      He  has no past medical history on file.    ROS   No chest pain, no shortness of breath, no abdominal pain       Objective:     Blood pressure 128/80, pulse 82, temperature 37 °C (98.6 °F), resp. rate 12, height 1.854 m (6' 1\"), weight 103.9 kg (229 lb), SpO2 98 %. Body mass index is 30.21 kg/m².   Physical Exam:  Alert, oriented in no acute distress.  Eye contact is good, speech goal directed, affect calm  HEENT: conjunctiva non-injected, sclera non-icteric, PERRL.  Neck No adenopathy or masses in the neck or supraclavicular regions.  Lungs: clear to auscultation bilaterally with good excursion.  CV: regular rate and rhythm. No murmur noted  Abdomen: soft, nontender, no HSM, No CVAT  Ext: no edema, color normal, peripheral pulses 2+, temperature " normal        Assessment and Plan:   The following treatment plan was discussed     1. Essential hypertension  Controlled, cotninue taking lisinopril 10mg qd, printed out the DASH diet for him, still recommend taking BP reading at home. We will recheck at annual visit in 2 mo.       Followup: Return in about 2 months (around 2/21/2018) for htn, labs. .           Please note that this dictation was created using voice recognition software. I have made every reasonable attempt to correct obvious errors, but I expect that there are errors of grammar and possibly content that I did not discover before finalizing the note.

## 2017-12-21 NOTE — ASSESSMENT & PLAN NOTE
This is chronic. Stable not that he has restarted lisinorpil 10mg qd.   Has not been monitoring BP at home.   Today /80   Also taking baby aspirin occasionally.   Denies lightheadedness, vision changes, headache, palpitations, chest pain, cough or leg swelling.

## 2018-01-06 DIAGNOSIS — E03.9 HYPOTHYROIDISM, UNSPECIFIED TYPE: ICD-10-CM

## 2018-01-08 RX ORDER — LEVOTHYROXINE SODIUM 88 UG/1
TABLET ORAL
Qty: 90 TAB | Refills: 0 | Status: SHIPPED | OUTPATIENT
Start: 2018-01-08 | End: 2018-02-22

## 2018-01-08 NOTE — TELEPHONE ENCOUNTER
Was the patient seen in the last year in this department? Yes     Does patient have an active prescription for medications requested? No     Received Request Via: Pharmacy     Last visit:12/21/17

## 2018-02-03 DIAGNOSIS — E78.5 HYPERLIPIDEMIA, UNSPECIFIED HYPERLIPIDEMIA TYPE: ICD-10-CM

## 2018-02-06 RX ORDER — SIMVASTATIN 10 MG
TABLET ORAL
Qty: 90 TAB | Refills: 2 | Status: SHIPPED | OUTPATIENT
Start: 2018-02-06 | End: 2018-03-26 | Stop reason: SDUPTHER

## 2018-02-13 ENCOUNTER — HOSPITAL ENCOUNTER (OUTPATIENT)
Dept: LAB | Facility: MEDICAL CENTER | Age: 48
End: 2018-02-13
Attending: PHYSICIAN ASSISTANT
Payer: COMMERCIAL

## 2018-02-13 DIAGNOSIS — E03.9 HYPOTHYROIDISM, UNSPECIFIED TYPE: ICD-10-CM

## 2018-02-13 DIAGNOSIS — I10 ESSENTIAL HYPERTENSION: ICD-10-CM

## 2018-02-13 DIAGNOSIS — E78.5 HYPERLIPIDEMIA, UNSPECIFIED HYPERLIPIDEMIA TYPE: ICD-10-CM

## 2018-02-13 LAB
ALBUMIN SERPL BCP-MCNC: 4.4 G/DL (ref 3.2–4.9)
ALBUMIN/GLOB SERPL: 1.3 G/DL
ALP SERPL-CCNC: 59 U/L (ref 30–99)
ALT SERPL-CCNC: 21 U/L (ref 2–50)
ANION GAP SERPL CALC-SCNC: 6 MMOL/L (ref 0–11.9)
AST SERPL-CCNC: 21 U/L (ref 12–45)
BASOPHILS # BLD AUTO: 0.6 % (ref 0–1.8)
BASOPHILS # BLD: 0.03 K/UL (ref 0–0.12)
BILIRUB SERPL-MCNC: 1.1 MG/DL (ref 0.1–1.5)
BUN SERPL-MCNC: 19 MG/DL (ref 8–22)
CALCIUM SERPL-MCNC: 10 MG/DL (ref 8.5–10.5)
CHLORIDE SERPL-SCNC: 103 MMOL/L (ref 96–112)
CHOLEST SERPL-MCNC: 213 MG/DL (ref 100–199)
CO2 SERPL-SCNC: 30 MMOL/L (ref 20–33)
CREAT SERPL-MCNC: 1.18 MG/DL (ref 0.5–1.4)
EOSINOPHIL # BLD AUTO: 0.18 K/UL (ref 0–0.51)
EOSINOPHIL NFR BLD: 3.6 % (ref 0–6.9)
ERYTHROCYTE [DISTWIDTH] IN BLOOD BY AUTOMATED COUNT: 42.7 FL (ref 35.9–50)
GLOBULIN SER CALC-MCNC: 3.3 G/DL (ref 1.9–3.5)
GLUCOSE SERPL-MCNC: 96 MG/DL (ref 65–99)
HCT VFR BLD AUTO: 47.1 % (ref 42–52)
HDLC SERPL-MCNC: 59 MG/DL
HGB BLD-MCNC: 16.1 G/DL (ref 14–18)
IMM GRANULOCYTES # BLD AUTO: 0 K/UL (ref 0–0.11)
IMM GRANULOCYTES NFR BLD AUTO: 0 % (ref 0–0.9)
LDLC SERPL CALC-MCNC: 126 MG/DL
LYMPHOCYTES # BLD AUTO: 1.83 K/UL (ref 1–4.8)
LYMPHOCYTES NFR BLD: 36.9 % (ref 22–41)
MCH RBC QN AUTO: 32.1 PG (ref 27–33)
MCHC RBC AUTO-ENTMCNC: 34.2 G/DL (ref 33.7–35.3)
MCV RBC AUTO: 94 FL (ref 81.4–97.8)
MONOCYTES # BLD AUTO: 0.39 K/UL (ref 0–0.85)
MONOCYTES NFR BLD AUTO: 7.9 % (ref 0–13.4)
NEUTROPHILS # BLD AUTO: 2.53 K/UL (ref 1.82–7.42)
NEUTROPHILS NFR BLD: 51 % (ref 44–72)
NRBC # BLD AUTO: 0 K/UL
NRBC BLD-RTO: 0 /100 WBC
PLATELET # BLD AUTO: 247 K/UL (ref 164–446)
PMV BLD AUTO: 11.6 FL (ref 9–12.9)
POTASSIUM SERPL-SCNC: 4.3 MMOL/L (ref 3.6–5.5)
PROT SERPL-MCNC: 7.7 G/DL (ref 6–8.2)
RBC # BLD AUTO: 5.01 M/UL (ref 4.7–6.1)
SODIUM SERPL-SCNC: 139 MMOL/L (ref 135–145)
T4 FREE SERPL-MCNC: 0.88 NG/DL (ref 0.53–1.43)
TRIGL SERPL-MCNC: 140 MG/DL (ref 0–149)
TSH SERPL DL<=0.005 MIU/L-ACNC: 4.38 UIU/ML (ref 0.38–5.33)
WBC # BLD AUTO: 5 K/UL (ref 4.8–10.8)

## 2018-02-13 PROCEDURE — 80061 LIPID PANEL: CPT

## 2018-02-13 PROCEDURE — 85025 COMPLETE CBC W/AUTO DIFF WBC: CPT

## 2018-02-13 PROCEDURE — 84402 ASSAY OF FREE TESTOSTERONE: CPT

## 2018-02-13 PROCEDURE — 36415 COLL VENOUS BLD VENIPUNCTURE: CPT

## 2018-02-13 PROCEDURE — 84443 ASSAY THYROID STIM HORMONE: CPT

## 2018-02-13 PROCEDURE — 80053 COMPREHEN METABOLIC PANEL: CPT

## 2018-02-13 PROCEDURE — 84403 ASSAY OF TOTAL TESTOSTERONE: CPT

## 2018-02-13 PROCEDURE — 84439 ASSAY OF FREE THYROXINE: CPT

## 2018-02-17 LAB — MISCELLANEOUS LAB RESULT MISCLAB: NORMAL

## 2018-02-22 ENCOUNTER — OFFICE VISIT (OUTPATIENT)
Dept: MEDICAL GROUP | Facility: LAB | Age: 48
End: 2018-02-22
Payer: COMMERCIAL

## 2018-02-22 VITALS
HEIGHT: 73 IN | OXYGEN SATURATION: 97 % | SYSTOLIC BLOOD PRESSURE: 120 MMHG | BODY MASS INDEX: 29.82 KG/M2 | DIASTOLIC BLOOD PRESSURE: 86 MMHG | RESPIRATION RATE: 14 BRPM | HEART RATE: 72 BPM | TEMPERATURE: 98.6 F | WEIGHT: 225 LBS

## 2018-02-22 DIAGNOSIS — E78.5 HYPERLIPIDEMIA, UNSPECIFIED HYPERLIPIDEMIA TYPE: ICD-10-CM

## 2018-02-22 DIAGNOSIS — E03.9 HYPOTHYROIDISM, UNSPECIFIED TYPE: ICD-10-CM

## 2018-02-22 DIAGNOSIS — M54.50 CHRONIC BILATERAL LOW BACK PAIN WITHOUT SCIATICA: ICD-10-CM

## 2018-02-22 DIAGNOSIS — R61 HYPERHIDROSIS: ICD-10-CM

## 2018-02-22 DIAGNOSIS — I10 ESSENTIAL HYPERTENSION: ICD-10-CM

## 2018-02-22 DIAGNOSIS — G89.29 CHRONIC BILATERAL LOW BACK PAIN WITHOUT SCIATICA: ICD-10-CM

## 2018-02-22 DIAGNOSIS — Z00.00 PREVENTATIVE HEALTH CARE: Primary | ICD-10-CM

## 2018-02-22 PROCEDURE — 99214 OFFICE O/P EST MOD 30 MIN: CPT | Performed by: PHYSICIAN ASSISTANT

## 2018-02-22 ASSESSMENT — PAIN SCALES - GENERAL: PAINLEVEL: 2=MINIMAL-SLIGHT

## 2018-02-22 NOTE — PROGRESS NOTES
Subjective:     Chief Complaint   Patient presents with   • Annual Exam     Dannie Boggs is a 47 y.o. male here today for annual exam without any complaints as listed below    TDap- 11/21/17  Flu- 11/21/17    Hyperlipidemia  This is chronic. Doing well although slighty elevated LDL. Taking simvastatin 10 mg in the morning. No myalgias, GI upset, or other side effects from medication. Denies CP or stroke symptoms. Does not take daily ASA.   Diet: Very healthy, he has been weight training and is on a heavy protein and veggies very little simple carbohydrate diet. Exercises 6 days weekly  Denies tobacco use  Has stopped all alcohol use although when he does it is very rare  Results for DANNIE BOGGS (MRN [id number]) as of 2/22/2018 13:41   Ref. Range 1/30/2017 06:35 7/11/2017 10:01 2/13/2018 08:36   Cholesterol,Tot Latest Ref Range: 100 - 199 mg/dL 262 (H) 198 213 (H)   Triglycerides Latest Ref Range: 0 - 149 mg/dL 113 105 140   HDL Latest Ref Range: >=40 mg/dL 62 59 59   LDL Latest Ref Range: <100 mg/dL 177 (H) 118 (H) 126 (H)       Hypothyroidism  This is chronic. Pt treating with levothyroxine 88mcg 5 days weekly and taking 100mcg 2 days weekly  Taking medicine as directed on empty stomach with water and waits at last 30 minutes to consume other food or beverage.   Working out 6 days weekly.   Diet: very healthy.   Denies palpitations, skin changes, temperature intolerance, changes in bowel habits.   Ref. Range 2/13/2018 08:36   TSH Latest Ref Range: 0.380 - 5.330 uIU/mL 4.380   Free T-4 Latest Ref Range: 0.53 - 1.43 ng/dL 0.88        Essential hypertension  This is chronic. Stable.    Has not been monitoring BP at home since has the wrong suff size.   Today /86   2nd reading was 122/82  Currently taking lisinopril 10mg qd as directed.    Denies lightheadedness, vision changes, headache, palpitations, chest pain, cough or leg swelling.    Chronic bilateral low back pain without sciatica  For the  past year he has had a intermittent dull to sharp ache in low back.   Seems to flare with straightening into good posture or walking or twisting.   Pain will come on for few minutes to sometimes an hour and then resolved on its own  Better with stretches, ice, heat, Tylenol or ibuprofen  Did get a  and has been working out more muscle groups which seems to help although it is still there.   Works out 6 days a week most days he works out twice a day cardio and weights. Stretches prior to every workout.   Has gone online and done several of the low back stretches.   Denies any back injury  Denies radiating pain, tingling or numbness, bowel or bladder incontinence, saddle paresthesias, muscle weakness    Hyperhidrosis  Feels he has always had increase sweat  Although over the last few years stated is harder to control  Has tried several antiperspirants or deodorants  Has been trying to use them at night and sometimes during the day as well.  Just notices increase sweat in his armpit area  Has to change shirts throughout the day.   2/13/18 labs- CBC, CMP, testosterone, thyroid function were all normal.      Current medicines (including changes today)  Current Outpatient Prescriptions   Medication Sig Dispense Refill   • aluminum chloride (DRYSOL) 20 % external solution Apply to axillae nightly, wash off in am. 1 Bottle 3   • simvastatin (ZOCOR) 10 MG Tab Take 1 Tab by mouth every bedtime. TAKE 1 TAB BY MOUTH EVERY EVENING. 90 Tab 3   • [START ON 3/31/2018] levothyroxine (SYNTHROID) 100 MCG Tab Take 1 Tab by mouth in the morning every Sat and Sun. 24 Tab 3   • lisinopril (PRINIVIL) 10 MG Tab Take 1 Tab by mouth every day. 90 Tab 3   • levothyroxine (SYNTHROID) 88 MCG Tab Take 1 Tab by mouth every morning Monday through Friday. 20 Tab 3     No current facility-administered medications for this visit.      He  has no past medical history on file.    ROS   No chest pain, no shortness of breath, no abdominal  "pain       Objective:     Blood pressure 120/86, pulse 72, temperature 37 °C (98.6 °F), resp. rate 14, height 1.854 m (6' 1\"), weight 102.1 kg (225 lb), SpO2 97 %. Body mass index is 29.69 kg/m².   Physical Exam:  Alert, oriented in no acute distress.  Eye contact is good, speech goal directed, affect calm  HEENT: conjunctiva non-injected, sclera non-icteric, PERRL.  Pinna normal. TM pearly gray.   Oral mucous membranes pink and moist with no lesions.  Neck No adenopathy or masses in the neck or supraclavicular regions.  Lungs: clear to auscultation bilaterally with good excursion.  CV: regular rate and rhythm.  Abdomen: soft, nontender, no HSM, No CVAT  Ext: no edema, color normal, peripheral pulses 2+, temperature normal    Assessment and Plan:   The following treatment plan was discussed     1. Preventative health care  TDap- 11/21/17  Flu- 11/21/17    2. Hypothyroidism, unspecified type  Stable, continue current treatment, levothyroxine 88mcg 5 days weekly and taking 100mcg 2 days weekly  - TSH; Future  - FREE THYROXINE; Future    3. Essential hypertension  Stable, diastolic seems to be high normal, would like pt to try to order correct sized cuff or go to pharmacy.   We will check that first before changing dosage. continue current treatment  - COMP METABOLIC PANEL; Future    4. Hyperlipidemia, unspecified hyperlipidemia type  Elevated, we will switch from taking simvastatin in am to take at night. Continue with extremely healthy lifestyle. We will recheck labs and hopefully not need to increase statin.   - LIPID PROFILE; Future    5. Hyperhidrosis  New dx, recommend continuing using normal antiperspirant daily although we will add drysol nightly, wash off in a.m. discussed side effects in depth.   Also discuss other treatment options- pt would like to try this first.  - aluminum chloride (DRYSOL) 20 % external solution; Apply to axillae nightly, wash off in am.  Dispense: 1 Bottle; Refill: 3    6. Chronic " bilateral low back pain without sciatica  New diagnosis, exam benign. No pain today.  Since he already exercises regularly and eats very healthy we will send for physical therapy referral.   Especially since he had notable improvement when adjusting exercise routine.   Continue with stretches twice daily and prior to workout, alternating ice and heat, using Tylenol or ibuprofen as needed  - REFERRAL TO PHYSICAL THERAPY Reason for Therapy: Eval/Treat/Report      Followup: Return in about 6 months (around 8/22/2018) for chronic conditions.           Please note that this dictation was created using voice recognition software. I have made every reasonable attempt to correct obvious errors, but I expect that there are errors of grammar and possibly content that I did not discover before finalizing the note.

## 2018-02-22 NOTE — ASSESSMENT & PLAN NOTE
For the past year he has had a intermittent dull to sharp ache in low back.   Seems to flare with straightening into good posture or walking or twisting.   Pain will come on for few minutes to sometimes an hour and then resolved on its own  Better with stretches, ice, heat, Tylenol or ibuprofen  Did get a  and has been working out more muscle groups which seems to help although it is still there.   Works out 6 days a week most days he works out twice a day cardio and weights. Stretches prior to every workout.   Has gone online and done several of the low back stretches.   Denies any back injury  Denies radiating pain, tingling or numbness, bowel or bladder incontinence, saddle paresthesias, muscle weakness

## 2018-02-22 NOTE — ASSESSMENT & PLAN NOTE
This is chronic. Stable.    Has not been monitoring BP at home since has the wrong suff size.   Today /86   2nd reading was 122/82  Currently taking lisinopril 10mg qd as directed.    Denies lightheadedness, vision changes, headache, palpitations, chest pain, cough or leg swelling.

## 2018-02-22 NOTE — ASSESSMENT & PLAN NOTE
Feels he has always had increase sweat  Although over the last few years stated is harder to control  Has tried several antiperspirants or deodorants  Has been trying to use them at night and sometimes during the day as well.  Just notices increase sweat in his armpit area  Has to change shirts throughout the day.   2/13/18 labs- CBC, CMP, testosterone, thyroid function were all normal.

## 2018-02-22 NOTE — ASSESSMENT & PLAN NOTE
This is chronic. Pt treating with levothyroxine 88mcg 5 days weekly and taking 100mcg 2 days weekly  Taking medicine as directed on empty stomach with water and waits at last 30 minutes to consume other food or beverage.   Working out 6 days weekly.   Diet: very healthy.   Denies palpitations, skin changes, temperature intolerance, changes in bowel habits.   Ref. Range 2/13/2018 08:36   TSH Latest Ref Range: 0.380 - 5.330 uIU/mL 4.380   Free T-4 Latest Ref Range: 0.53 - 1.43 ng/dL 0.88

## 2018-02-22 NOTE — ASSESSMENT & PLAN NOTE
This is chronic. Doing well although slighty elevated LDL. Taking simvastatin 10 mg in the morning. No myalgias, GI upset, or other side effects from medication. Denies CP or stroke symptoms. Does not take daily ASA.   Diet: Very healthy, he has been weight training and is on a heavy protein and veggies very little simple carbohydrate diet. Exercises 6 days weekly  Denies tobacco use  Has stopped all alcohol use although when he does it is very rare  Results for DANNIE CHANEY (MRN [id number]) as of 2/22/2018 13:41   Ref. Range 1/30/2017 06:35 7/11/2017 10:01 2/13/2018 08:36   Cholesterol,Tot Latest Ref Range: 100 - 199 mg/dL 262 (H) 198 213 (H)   Triglycerides Latest Ref Range: 0 - 149 mg/dL 113 105 140   HDL Latest Ref Range: >=40 mg/dL 62 59 59   LDL Latest Ref Range: <100 mg/dL 177 (H) 118 (H) 126 (H)

## 2018-03-26 DIAGNOSIS — E03.9 HYPOTHYROIDISM, UNSPECIFIED TYPE: ICD-10-CM

## 2018-03-26 DIAGNOSIS — I10 ESSENTIAL HYPERTENSION: ICD-10-CM

## 2018-03-26 DIAGNOSIS — E78.5 HYPERLIPIDEMIA, UNSPECIFIED HYPERLIPIDEMIA TYPE: ICD-10-CM

## 2018-03-27 DIAGNOSIS — E03.9 HYPOTHYROIDISM, UNSPECIFIED TYPE: ICD-10-CM

## 2018-03-28 RX ORDER — SIMVASTATIN 10 MG
10 TABLET ORAL
Qty: 90 TAB | Refills: 3 | Status: SHIPPED | OUTPATIENT
Start: 2018-03-28 | End: 2019-02-05 | Stop reason: SDUPTHER

## 2018-03-28 RX ORDER — LEVOTHYROXINE SODIUM 0.1 MG/1
100 TABLET ORAL
Qty: 24 TAB | Refills: 3 | Status: SHIPPED | OUTPATIENT
Start: 2018-03-31 | End: 2018-12-08 | Stop reason: SDUPTHER

## 2018-03-28 RX ORDER — LEVOTHYROXINE SODIUM 88 UG/1
TABLET ORAL
Qty: 90 TAB | Refills: 0 | OUTPATIENT
Start: 2018-03-28

## 2018-03-28 RX ORDER — LEVOTHYROXINE SODIUM 88 UG/1
88 TABLET ORAL
Qty: 60 TAB | Refills: 3 | Status: SHIPPED | OUTPATIENT
Start: 2018-03-28 | End: 2019-06-28

## 2018-03-28 RX ORDER — LISINOPRIL 10 MG/1
10 TABLET ORAL DAILY
Qty: 90 TAB | Refills: 3 | Status: SHIPPED | OUTPATIENT
Start: 2018-03-28 | End: 2019-02-05 | Stop reason: SDUPTHER

## 2018-04-11 RX ORDER — LEVOTHYROXINE SODIUM 0.1 MG/1
100 TABLET ORAL
Qty: 8 TAB | Refills: 2 | Status: SHIPPED | OUTPATIENT
Start: 2018-04-14 | End: 2018-08-26 | Stop reason: SDUPTHER

## 2018-04-24 DIAGNOSIS — I10 ESSENTIAL HYPERTENSION: ICD-10-CM

## 2018-04-24 NOTE — TELEPHONE ENCOUNTER
Was the patient seen in the last year in this department? Yes     Does patient have an active prescription for medications requested? No     Received Request Via: Pharmacy     Last visit:2/22/18

## 2018-04-25 RX ORDER — LISINOPRIL 10 MG/1
10 TABLET ORAL DAILY
Qty: 90 TAB | Refills: 2 | Status: SHIPPED | OUTPATIENT
Start: 2018-04-25 | End: 2019-06-05

## 2018-08-17 ENCOUNTER — OFFICE VISIT (OUTPATIENT)
Dept: MEDICAL GROUP | Facility: MEDICAL CENTER | Age: 48
End: 2018-08-17
Payer: COMMERCIAL

## 2018-08-17 VITALS
OXYGEN SATURATION: 95 % | BODY MASS INDEX: 31.14 KG/M2 | TEMPERATURE: 97.7 F | HEART RATE: 62 BPM | SYSTOLIC BLOOD PRESSURE: 132 MMHG | DIASTOLIC BLOOD PRESSURE: 80 MMHG | HEIGHT: 73 IN | WEIGHT: 235 LBS | RESPIRATION RATE: 16 BRPM

## 2018-08-17 DIAGNOSIS — E78.00 PURE HYPERCHOLESTEROLEMIA: ICD-10-CM

## 2018-08-17 DIAGNOSIS — E03.9 HYPOTHYROIDISM, UNSPECIFIED TYPE: ICD-10-CM

## 2018-08-17 DIAGNOSIS — M54.50 CHRONIC BILATERAL LOW BACK PAIN WITHOUT SCIATICA: ICD-10-CM

## 2018-08-17 DIAGNOSIS — E66.9 OBESITY (BMI 30-39.9): ICD-10-CM

## 2018-08-17 DIAGNOSIS — I10 ESSENTIAL HYPERTENSION: ICD-10-CM

## 2018-08-17 DIAGNOSIS — G89.29 CHRONIC BILATERAL LOW BACK PAIN WITHOUT SCIATICA: ICD-10-CM

## 2018-08-17 PROCEDURE — 99214 OFFICE O/P EST MOD 30 MIN: CPT | Performed by: NURSE PRACTITIONER

## 2018-08-17 NOTE — PROGRESS NOTES
Subjective:     Chief Complaint   Patient presents with   • Establish Care   • Back Pain     Matt Boggs is a 48 y.o. male here today to transfer care from [facility].  He is , has 2 teenage children.  We discussed:    Hypothyroidism  On levothyroxine 88 mcg 5 days/ wk and 100 mcg on weekends  Good energy level.  No constipation, hair loss, heat or cold intolerance.  He has, however, had some progressive weight gain over the last year    Essential hypertension  On lisinopril 10 mg daily  Home readings primarily in the 120s over 80s, highest reading recently was 130 systolic  No chest pain, dizziness, chronic cough    Obesity (BMI 30-39.9)  Approximately 10 pound weight gain over the last year, no change to diet or exercise regimen    Chronic bilateral low back pain without sciatica  Long-standing problem with intermittent flares.  Pain can be either right or left, muscles feel tight and feels that he is having spasms at times.  No specific history of injury.  No radiation to lower extremities.  No numbness, tingling, weakness in the legs.  Just started physical therapy last week.  No imaging    Hyperlipidemia  Chronic issue managed with simvastatin, tolerating medication without side effect including myalgia.  Last LDL remained above goal at 126.  Following a healthy diet, exercising regularly       Current medicines (including changes today)  Current Outpatient Prescriptions   Medication Sig Dispense Refill   • simvastatin (ZOCOR) 10 MG Tab Take 1 Tab by mouth every bedtime. TAKE 1 TAB BY MOUTH EVERY EVENING. 90 Tab 3   • levothyroxine (SYNTHROID) 100 MCG Tab Take 1 Tab by mouth in the morning every Sat and Sun. 24 Tab 3   • lisinopril (PRINIVIL) 10 MG Tab Take 1 Tab by mouth every day. 90 Tab 3   • levothyroxine (SYNTHROID) 88 MCG Tab Take 1 Tab by mouth every morning Monday through Friday. 60 Tab 3   • aluminum chloride (DRYSOL) 20 % external solution Apply to axillae nightly, wash off in am.  "1 Bottle 3   • lisinopril (PRINIVIL) 10 MG Tab TAKE 1 TAB BY MOUTH EVERY DAY. 90 Tab 2   • levothyroxine (SYNTHROID) 100 MCG Tab TAKE 1 TAB BY MOUTH IN THE MORNING EVERY SAT AND SUN. 8 Tab 2     No current facility-administered medications for this visit.      He  has no past medical history on file.    ROS included above     Objective:     Blood pressure 132/80, pulse 62, temperature 36.5 °C (97.7 °F), resp. rate 16, height 1.854 m (6' 1\"), weight 106.6 kg (235 lb), SpO2 95 %. Body mass index is 31 kg/m².     Physical Exam:  General: Alert, oriented in no acute distress.  Eye contact is good, speech is normal, affect calm  Lungs: clear to auscultation bilaterally, normal effort, no wheeze/ rhonchi/ rales.  CV: regular rate and rhythm, S1, S2, no murmur  Abdomen: soft, nontender  MS: DTR 2+ patella. Strength 5/5 prox and distal LE. Mild tenderness in paraspinous muscles lumbar spine without current spasm. No spinous process tenderness.  No pain with stress of SI. Full hip ROM. Poor hamstring flexibility.   Ext: no edema, color normal, vascularity normal, temperature normal    Assessment and Plan:   The following treatment plan was discussed   1. Hypothyroidism, unspecified type   gradual weight gain over the last year, otherwise euthyroid.  Reevaluate labs  TSH+FREE T4   2. Essential hypertension   stable  COMP METABOLIC PANEL   3. Pure hypercholesterolemia   last LDL above goal on simvastatin.  Reevaluate  LIPID PROFILE   4. Chronic bilateral low back pain without sciatica   long-standing difficulty with intermittent flares of low back pain, recently started physical therapy which should be beneficial.  We will update imaging.  Discussed occasional NSAID use.  He will notify me if not improving with PT  DX-LUMBAR SPINE-2 OR 3 VIEWS   5. Obesity (BMI 30-39.9)  Patient identified as having weight management issue.  Appropriate orders and counseling given.       Followup: pending labs         Please note that this " dictation was created using voice recognition software. I have worked with consultants from the vendor as well as technical experts from [facility] to optimize the interface. I have made every reasonable attempt to correct obvious errors, but I expect that there are errors of grammar and possibly content that I did not discover before finalizing the note.

## 2018-08-21 ENCOUNTER — HOSPITAL ENCOUNTER (OUTPATIENT)
Dept: RADIOLOGY | Facility: MEDICAL CENTER | Age: 48
End: 2018-08-21
Attending: NURSE PRACTITIONER
Payer: COMMERCIAL

## 2018-08-21 ENCOUNTER — HOSPITAL ENCOUNTER (OUTPATIENT)
Dept: LAB | Facility: MEDICAL CENTER | Age: 48
End: 2018-08-21
Attending: NURSE PRACTITIONER
Payer: COMMERCIAL

## 2018-08-21 DIAGNOSIS — E78.00 PURE HYPERCHOLESTEROLEMIA: ICD-10-CM

## 2018-08-21 DIAGNOSIS — M54.50 CHRONIC BILATERAL LOW BACK PAIN WITHOUT SCIATICA: ICD-10-CM

## 2018-08-21 DIAGNOSIS — G89.29 CHRONIC BILATERAL LOW BACK PAIN WITHOUT SCIATICA: ICD-10-CM

## 2018-08-21 DIAGNOSIS — I10 ESSENTIAL HYPERTENSION: ICD-10-CM

## 2018-08-21 LAB
ALBUMIN SERPL BCP-MCNC: 4.3 G/DL (ref 3.2–4.9)
ALBUMIN/GLOB SERPL: 1.6 G/DL
ALP SERPL-CCNC: 50 U/L (ref 30–99)
ALT SERPL-CCNC: 21 U/L (ref 2–50)
ANION GAP SERPL CALC-SCNC: 6 MMOL/L (ref 0–11.9)
AST SERPL-CCNC: 21 U/L (ref 12–45)
BILIRUB SERPL-MCNC: 1.4 MG/DL (ref 0.1–1.5)
BUN SERPL-MCNC: 21 MG/DL (ref 8–22)
CALCIUM SERPL-MCNC: 9 MG/DL (ref 8.5–10.5)
CHLORIDE SERPL-SCNC: 102 MMOL/L (ref 96–112)
CHOLEST SERPL-MCNC: 214 MG/DL (ref 100–199)
CO2 SERPL-SCNC: 27 MMOL/L (ref 20–33)
CREAT SERPL-MCNC: 1.25 MG/DL (ref 0.5–1.4)
GLOBULIN SER CALC-MCNC: 2.7 G/DL (ref 1.9–3.5)
GLUCOSE SERPL-MCNC: 99 MG/DL (ref 65–99)
HDLC SERPL-MCNC: 53 MG/DL
LDLC SERPL CALC-MCNC: 138 MG/DL
POTASSIUM SERPL-SCNC: 4 MMOL/L (ref 3.6–5.5)
PROT SERPL-MCNC: 7 G/DL (ref 6–8.2)
SODIUM SERPL-SCNC: 135 MMOL/L (ref 135–145)
T4 FREE SERPL-MCNC: 1.07 NG/DL (ref 0.53–1.43)
TRIGL SERPL-MCNC: 117 MG/DL (ref 0–149)
TSH SERPL DL<=0.005 MIU/L-ACNC: 6.72 UIU/ML (ref 0.38–5.33)

## 2018-08-21 PROCEDURE — 80061 LIPID PANEL: CPT

## 2018-08-21 PROCEDURE — 80053 COMPREHEN METABOLIC PANEL: CPT

## 2018-08-21 PROCEDURE — 36415 COLL VENOUS BLD VENIPUNCTURE: CPT

## 2018-08-21 PROCEDURE — 72100 X-RAY EXAM L-S SPINE 2/3 VWS: CPT

## 2018-08-21 PROCEDURE — 84443 ASSAY THYROID STIM HORMONE: CPT

## 2018-08-21 PROCEDURE — 84439 ASSAY OF FREE THYROXINE: CPT

## 2018-08-26 ENCOUNTER — PATIENT MESSAGE (OUTPATIENT)
Dept: MEDICAL GROUP | Facility: MEDICAL CENTER | Age: 48
End: 2018-08-26

## 2018-08-26 DIAGNOSIS — E03.9 HYPOTHYROIDISM, UNSPECIFIED TYPE: ICD-10-CM

## 2018-08-26 RX ORDER — LEVOTHYROXINE SODIUM 0.1 MG/1
100 TABLET ORAL
Qty: 90 TAB | Refills: 3 | Status: SHIPPED | OUTPATIENT
Start: 2018-08-26 | End: 2019-06-28 | Stop reason: SDUPTHER

## 2018-08-27 ENCOUNTER — PATIENT MESSAGE (OUTPATIENT)
Dept: MEDICAL GROUP | Facility: MEDICAL CENTER | Age: 48
End: 2018-08-27

## 2018-08-27 NOTE — TELEPHONE ENCOUNTER
From: Matt Boggs  To: VIET Joe  Sent: 8/26/2018 4:44 PM PDT  Subject: Test Result Question    Ravi Borges,  This is regarding my lipid profile. The elevated cholesterol is a hereditary thing. I eat a low fat, low carb diet. Lot of vegetables and lean meat. Also I exercise 5-7 days a week, sometimes twice a day. It would be very difficult to eat any  or exercise more often. :)

## 2018-08-29 NOTE — TELEPHONE ENCOUNTER
From: Matt Boggs  To: VIET Joe  Sent: 8/27/2018 3:52 PM PDT  Subject: RE: Test Result Question    Neither of my parents have had a heart attack or stroke. My sister has had a heart attack but she does not take care of herself plus she was a smoker at the time of her heart attack. Both parents have hypertension. My mothers can get wildly out of control. She does not exercise and being that she is obese she may not be making the best food choices. My father is active and makes healthy food choices and he also has elevated blood pressure.     ----- Message -----  From: VIET Joe  Sent: 8/27/18, 3:45 PM  To: Matt Boggs  Subject: RE: Test Result Question    Have either of your parents had a heart attack or stroke? I see that your sister has had some heart problems.  Tawnya ANDRADE      ----- Message -----   From: Matt Boggs   Sent: 8/26/2018 4:44 PM PDT   To: VIET Joe  Subject: Test Result Question    Ravi Borges,  This is regarding my lipid profile. The elevated cholesterol is a hereditary thing. I eat a low fat, low carb diet. Lot of vegetables and lean meat. Also I exercise 5-7 days a week, sometimes twice a day. It would be very difficult to eat any  or exercise more often. :)

## 2018-12-08 DIAGNOSIS — E03.9 HYPOTHYROIDISM, UNSPECIFIED TYPE: ICD-10-CM

## 2018-12-10 RX ORDER — LEVOTHYROXINE SODIUM 0.1 MG/1
100 TABLET ORAL
Qty: 90 TAB | Refills: 3 | Status: SHIPPED | OUTPATIENT
Start: 2018-12-10 | End: 2019-06-28

## 2018-12-10 NOTE — TELEPHONE ENCOUNTER
Was the patient seen in the last year in this department? Yes lov 8/17/18    Does patient have an active prescription for medications requested? No     Received Request Via: Pharmacy

## 2019-02-05 DIAGNOSIS — E78.5 HYPERLIPIDEMIA, UNSPECIFIED HYPERLIPIDEMIA TYPE: ICD-10-CM

## 2019-02-05 DIAGNOSIS — I10 ESSENTIAL HYPERTENSION: ICD-10-CM

## 2019-02-05 RX ORDER — LISINOPRIL 10 MG/1
10 TABLET ORAL DAILY
Qty: 90 TAB | Refills: 1 | Status: SHIPPED | OUTPATIENT
Start: 2019-02-05 | End: 2019-05-25 | Stop reason: SDUPTHER

## 2019-02-05 RX ORDER — SIMVASTATIN 10 MG
10 TABLET ORAL
Qty: 90 TAB | Refills: 1 | Status: SHIPPED | OUTPATIENT
Start: 2019-02-05 | End: 2019-05-25 | Stop reason: SDUPTHER

## 2019-02-07 ENCOUNTER — PATIENT MESSAGE (OUTPATIENT)
Dept: MEDICAL GROUP | Facility: MEDICAL CENTER | Age: 49
End: 2019-02-07

## 2019-02-08 ENCOUNTER — PATIENT MESSAGE (OUTPATIENT)
Dept: MEDICAL GROUP | Facility: MEDICAL CENTER | Age: 49
End: 2019-02-08

## 2019-02-08 NOTE — TELEPHONE ENCOUNTER
From: Matt Boggs  To: VIET Joe  Sent: 2/7/2019 3:05 PM PST  Subject: Non-Urgent Medical Question    Hi,  Last few days after my lunch time work out and after I have a bite to eat I get terrible, what I think are gas pains in my stomach. It lasts for about an hour and it is intense double over sweating bullets kind of pain. It's happened a few times before but not often. I at first stopped drinking the protein shake that I was having at the time and it seemed to stop for awhile. That was a little over a year ago. But now it seems kind of random. I'm pretty plain when it comes to food as I only had a few bites of grilled chicken breast for lunch before it happened. Breakfast and dinner seem fine it's just that meal after a mid day workout. The only common denominator is mid day post work out. What do you think?    Jose

## 2019-02-08 NOTE — TELEPHONE ENCOUNTER
From: Matt Boggs  To: VIET Joe  Sent: 2/8/2019 11:14 AM PST  Subject: RE: Non-Urgent Medical Question    pain is stomach. I think it's gas mainly. Pain makes me feel nauseated. Belching yes. No heartburn. After the belching starts to feel better but it takes a while to belch.     Think it could be from exercise induced dehydration?    ----- Message -----  From: VIET Joe  Sent: 2/8/19, 9:23 AM  To: Matt Boggs  Subject: RE: Non-Urgent Medical Question    Where exactly is your pain? Is there any associated nausea, belching, heartburn?    ----- Message -----   From: Matt Boggs   Sent: 2/7/2019 3:05 PM PST   To: VIET Joe  Subject: Non-Urgent Medical Question    Hi,  Last few days after my lunch time work out and after I have a bite to eat I get terrible, what I think are gas pains in my stomach. It lasts for about an hour and it is intense double over sweating bullets kind of pain. It's happened a few times before but not often. I at first stopped drinking the protein shake that I was having at the time and it seemed to stop for awhile. That was a little over a year ago. But now it seems kind of random. I'm pretty plain when it comes to food as I only had a few bites of grilled chicken breast for lunch before it happened. Breakfast and dinner seem fine it's just that meal after a mid day workout. The only common denominator is mid day post work out. What do you think?    Jose

## 2019-05-25 ENCOUNTER — PATIENT MESSAGE (OUTPATIENT)
Dept: MEDICAL GROUP | Facility: MEDICAL CENTER | Age: 49
End: 2019-05-25

## 2019-05-25 DIAGNOSIS — I10 ESSENTIAL HYPERTENSION: ICD-10-CM

## 2019-05-25 DIAGNOSIS — E78.5 HYPERLIPIDEMIA, UNSPECIFIED HYPERLIPIDEMIA TYPE: ICD-10-CM

## 2019-05-25 RX ORDER — SIMVASTATIN 10 MG
10 TABLET ORAL
Qty: 90 TAB | Refills: 1 | Status: SHIPPED | OUTPATIENT
Start: 2019-05-25 | End: 2019-11-18 | Stop reason: SDUPTHER

## 2019-05-25 RX ORDER — LISINOPRIL 10 MG/1
10 TABLET ORAL DAILY
Qty: 90 TAB | Refills: 1 | Status: SHIPPED | OUTPATIENT
Start: 2019-05-25 | End: 2019-06-05 | Stop reason: SDUPTHER

## 2019-05-25 NOTE — TELEPHONE ENCOUNTER
From: Matt Boggs  To: VIET Joe  Sent: 5/25/2019 9:40 AM PDT  Subject: Prescription Question    Hi,    I tried to switch over to my healthcare providers prescription mail service which turned into an utter failure, as expected. ;) Thing is I am running out of the lisinopril and am out of the simvastatin. Hoping these can get sent to the [facility] on [address].    Thanks,    Jose Boggs

## 2019-05-26 NOTE — TELEPHONE ENCOUNTER
From: Matt Boggs  To: VIET Joe  Sent: 5/25/2019 11:26 AM PDT  Subject: Prescription Question    I haven't. [facility].    ----- Message -----  From: VIET Joe  Sent: 5/25/19, 9:44 AM  To: Matt Boggs  Subject: RE: Prescription Question    I will send them in. You do need your thyroid levels rechecked. Have you been back to the lab? Which lab would you like to use?    ----- Message -----   From: Matt Boggs   Sent: 5/25/2019 9:40 AM PDT   To: VIET Joe  Subject: Prescription Question    Hi,    I tried to switch over to my healthcare providers prescription mail service which turned into an utter failure, as expected. ;) Thing is I am running out of the lisinopril and am out of the simvastatin. Hoping these can get sent to the [facility] on [address].    Thanks,    Jose Boggs

## 2019-06-03 ENCOUNTER — HOSPITAL ENCOUNTER (OUTPATIENT)
Dept: LAB | Facility: MEDICAL CENTER | Age: 49
End: 2019-06-03
Attending: NURSE PRACTITIONER
Payer: COMMERCIAL

## 2019-06-03 ENCOUNTER — PATIENT MESSAGE (OUTPATIENT)
Dept: MEDICAL GROUP | Facility: MEDICAL CENTER | Age: 49
End: 2019-06-03

## 2019-06-03 LAB
T4 FREE SERPL-MCNC: 1.08 NG/DL (ref 0.53–1.43)
TSH SERPL DL<=0.005 MIU/L-ACNC: 4.53 UIU/ML (ref 0.38–5.33)

## 2019-06-03 PROCEDURE — 36415 COLL VENOUS BLD VENIPUNCTURE: CPT

## 2019-06-03 PROCEDURE — 84439 ASSAY OF FREE THYROXINE: CPT

## 2019-06-03 PROCEDURE — 84443 ASSAY THYROID STIM HORMONE: CPT

## 2019-06-04 ENCOUNTER — PATIENT MESSAGE (OUTPATIENT)
Dept: MEDICAL GROUP | Facility: MEDICAL CENTER | Age: 49
End: 2019-06-04

## 2019-06-04 DIAGNOSIS — I10 ESSENTIAL HYPERTENSION: ICD-10-CM

## 2019-06-04 NOTE — TELEPHONE ENCOUNTER
From: Matt Boggs  To: VIET Joe  Sent: 6/3/2019 5:45 PM PDT  Subject: Prescription Question    Read your comment on lab test results. Feeling good. No complaints. I ran out of my lisinopril. I usually get a notification from the Pharmacy when it's ready. I got the statin but they didn't have the lisinopril ready. I figured it would have been ready by now but no notification. Can you call it in for me? If you already did I'll drop by the pharmacy and hound them :). Thanks. - Jose  ----- Message -----  From: VIET Joe  Sent: 5/26/19, 11:21 AM  To: Matt Boggs  Subject: RE: Prescription Question    Ok, you can go to the lab anytime. The order for your test is in the computer system.  Tawnya ANDRADE      ----- Message -----   From: Matt Boggs    Sent: 5/25/2019 11:26 AM PDT   To: VIET Joe  Subject: Prescription Question    I haven't. [facility].    ----- Message -----  From: VIET Joe  Sent: 5/25/19, 9:44 AM  To: Matt Boggs  Subject: RE: Prescription Question    I will send them in. You do need your thyroid levels rechecked. Have you been back to the lab? Which lab would you like to use?    ----- Message -----   From: Matt Boggs   Sent: 5/25/2019 9:40 AM PDT   To: VIET Joe  Subject: Prescription Question    Hi,    I tried to switch over to my healthcare providers prescription mail service which turned into an utter failure, as expected. ;) Thing is I am running out of the lisinopril and am out of the simvastatin. Hoping these can get sent to the [facility].    Thanks,    Jose Boggs

## 2019-06-05 RX ORDER — LISINOPRIL 10 MG/1
10 TABLET ORAL DAILY
Qty: 90 TAB | Refills: 1 | Status: SHIPPED | OUTPATIENT
Start: 2019-06-05 | End: 2021-08-13

## 2019-06-05 NOTE — TELEPHONE ENCOUNTER
From: Matt Boggs  To: VIET Joe  Sent: 6/4/2019 8:41 PM PDT  Subject: Prescription Question    Heyo, I went to the pharmacy and they didn't have the Rx ready. I guess there was some sort of problem. They said they sent it back 3 times but have not received the info they need. Can you have a look? Thank you.    ----- Message -----  From: VIET Joe  Sent: 6/4/19, 7:12 AM  To: Matt Boggs  Subject: RE: Prescription Question    Prescription for lisinopril was sent on May 25, give them a call and let me know if there is any problems!  Tawnya ANDRADE      ----- Message -----   From: Matt Boggs   Sent: 6/3/2019 5:45 PM PDT   To: VIET Joe  Subject: Prescription Question    Read your comment on lab test results. Feeling good. No complaints. I ran out of my lisinopril. I usually get a notification from the Pharmacy when it's ready. I got the statin but they didn't have the lisinopril ready. I figured it would have been ready by now but no notification. Can you call it in for me? If you already did I'll drop by the pharmacy and hound them :). Thanks. Sriram Garcia  ----- Message -----  From: VIET Joe  Sent: 5/26/19, 11:21 AM  To: Matt Boggs  Subject: RE: Prescription Question    Ok, you can go to the lab anytime. The order for your test is in the computer system.  Tawnya ANDRADE      ----- Message -----   From: Matt Boggs   Sent: 5/25/2019 11:26 AM PDT   To: VIET Joe  Subject: Prescription Question    I haven't. I usually go to the [facility] lab on [address].    ----- Message -----  From: VIET Joe  Sent: 5/25/19, 9:44 AM  To: Matt Boggs  Subject: RE: Prescription Question    I will send them in. You do need your thyroid levels rechecked. Have you been back to the lab? Which lab would you like to use?    ----- Message -----   From: Matt Boggs   Sent: 5/25/2019 9:40 AM PDT   To: Katerina Coffey,  VIET  Subject: Prescription Question    Hi,    I tried to switch over to my healthcare providers prescription mail service which turned into an utter failure, as expected. ;) Thing is I am running out of the lisinopril and am out of the simvastatin. Hoping these can get sent to the [facility] on [address].    Thanks,    Jose Boggs

## 2019-06-28 ENCOUNTER — OFFICE VISIT (OUTPATIENT)
Dept: MEDICAL GROUP | Facility: MEDICAL CENTER | Age: 49
End: 2019-06-28
Payer: COMMERCIAL

## 2019-06-28 ENCOUNTER — HOSPITAL ENCOUNTER (OUTPATIENT)
Dept: LAB | Facility: MEDICAL CENTER | Age: 49
End: 2019-06-28
Attending: NURSE PRACTITIONER
Payer: COMMERCIAL

## 2019-06-28 VITALS
SYSTOLIC BLOOD PRESSURE: 138 MMHG | BODY MASS INDEX: 31.83 KG/M2 | OXYGEN SATURATION: 98 % | RESPIRATION RATE: 16 BRPM | HEIGHT: 72 IN | TEMPERATURE: 98.4 F | DIASTOLIC BLOOD PRESSURE: 90 MMHG | HEART RATE: 52 BPM | WEIGHT: 235 LBS

## 2019-06-28 DIAGNOSIS — F51.04 CHRONIC INSOMNIA: ICD-10-CM

## 2019-06-28 DIAGNOSIS — R10.10 UPPER ABDOMINAL PAIN OF UNKNOWN ETIOLOGY: ICD-10-CM

## 2019-06-28 DIAGNOSIS — E03.9 HYPOTHYROIDISM, UNSPECIFIED TYPE: ICD-10-CM

## 2019-06-28 DIAGNOSIS — Z78.9 ALCOHOL USE: ICD-10-CM

## 2019-06-28 DIAGNOSIS — I10 ESSENTIAL HYPERTENSION: ICD-10-CM

## 2019-06-28 PROBLEM — F51.01 PRIMARY INSOMNIA: Status: ACTIVE | Noted: 2019-06-28

## 2019-06-28 LAB
ALBUMIN SERPL BCP-MCNC: 4.1 G/DL (ref 3.2–4.9)
ALBUMIN/GLOB SERPL: 1.3 G/DL
ALP SERPL-CCNC: 269 U/L (ref 30–99)
ALT SERPL-CCNC: 466 U/L (ref 2–50)
AMYLASE SERPL-CCNC: 52 U/L (ref 20–103)
ANION GAP SERPL CALC-SCNC: 10 MMOL/L (ref 0–11.9)
AST SERPL-CCNC: 401 U/L (ref 12–45)
BASOPHILS # BLD AUTO: 0.8 % (ref 0–1.8)
BASOPHILS # BLD: 0.04 K/UL (ref 0–0.12)
BILIRUB SERPL-MCNC: 1.1 MG/DL (ref 0.1–1.5)
BUN SERPL-MCNC: 13 MG/DL (ref 8–22)
CALCIUM SERPL-MCNC: 9.2 MG/DL (ref 8.5–10.5)
CHLORIDE SERPL-SCNC: 104 MMOL/L (ref 96–112)
CO2 SERPL-SCNC: 25 MMOL/L (ref 20–33)
CREAT SERPL-MCNC: 1.14 MG/DL (ref 0.5–1.4)
EOSINOPHIL # BLD AUTO: 0.08 K/UL (ref 0–0.51)
EOSINOPHIL NFR BLD: 1.7 % (ref 0–6.9)
ERYTHROCYTE [DISTWIDTH] IN BLOOD BY AUTOMATED COUNT: 41.6 FL (ref 35.9–50)
FASTING STATUS PATIENT QL REPORTED: NORMAL
GLOBULIN SER CALC-MCNC: 3.1 G/DL (ref 1.9–3.5)
GLUCOSE SERPL-MCNC: 87 MG/DL (ref 65–99)
HCT VFR BLD AUTO: 42.8 % (ref 42–52)
HGB BLD-MCNC: 14.6 G/DL (ref 14–18)
IMM GRANULOCYTES # BLD AUTO: 0.01 K/UL (ref 0–0.11)
IMM GRANULOCYTES NFR BLD AUTO: 0.2 % (ref 0–0.9)
LIPASE SERPL-CCNC: 59 U/L (ref 11–82)
LYMPHOCYTES # BLD AUTO: 1.41 K/UL (ref 1–4.8)
LYMPHOCYTES NFR BLD: 29.7 % (ref 22–41)
MCH RBC QN AUTO: 32 PG (ref 27–33)
MCHC RBC AUTO-ENTMCNC: 34.1 G/DL (ref 33.7–35.3)
MCV RBC AUTO: 93.9 FL (ref 81.4–97.8)
MONOCYTES # BLD AUTO: 0.55 K/UL (ref 0–0.85)
MONOCYTES NFR BLD AUTO: 11.6 % (ref 0–13.4)
NEUTROPHILS # BLD AUTO: 2.66 K/UL (ref 1.82–7.42)
NEUTROPHILS NFR BLD: 56 % (ref 44–72)
NRBC # BLD AUTO: 0 K/UL
NRBC BLD-RTO: 0 /100 WBC
PLATELET # BLD AUTO: 191 K/UL (ref 164–446)
PMV BLD AUTO: 11.4 FL (ref 9–12.9)
POTASSIUM SERPL-SCNC: 3.7 MMOL/L (ref 3.6–5.5)
PROT SERPL-MCNC: 7.2 G/DL (ref 6–8.2)
RBC # BLD AUTO: 4.56 M/UL (ref 4.7–6.1)
SODIUM SERPL-SCNC: 139 MMOL/L (ref 135–145)
WBC # BLD AUTO: 4.8 K/UL (ref 4.8–10.8)

## 2019-06-28 PROCEDURE — 36415 COLL VENOUS BLD VENIPUNCTURE: CPT

## 2019-06-28 PROCEDURE — 82150 ASSAY OF AMYLASE: CPT

## 2019-06-28 PROCEDURE — 80053 COMPREHEN METABOLIC PANEL: CPT

## 2019-06-28 PROCEDURE — 85025 COMPLETE CBC W/AUTO DIFF WBC: CPT

## 2019-06-28 PROCEDURE — 99214 OFFICE O/P EST MOD 30 MIN: CPT | Performed by: NURSE PRACTITIONER

## 2019-06-28 PROCEDURE — 83690 ASSAY OF LIPASE: CPT

## 2019-06-28 RX ORDER — OMEPRAZOLE 20 MG/1
20 CAPSULE, DELAYED RELEASE ORAL DAILY
Qty: 30 CAP | Refills: 0 | Status: SHIPPED | OUTPATIENT
Start: 2019-06-28 | End: 2019-07-20 | Stop reason: SDUPTHER

## 2019-06-28 RX ORDER — LEVOTHYROXINE SODIUM 0.1 MG/1
100 TABLET ORAL
Qty: 90 TAB | Refills: 3 | Status: SHIPPED | OUTPATIENT
Start: 2019-06-28 | End: 2020-06-23

## 2019-06-28 RX ORDER — TRAZODONE HYDROCHLORIDE 50 MG/1
50-150 TABLET ORAL NIGHTLY PRN
Qty: 60 TAB | Refills: 0 | Status: SHIPPED | OUTPATIENT
Start: 2019-06-28 | End: 2019-07-14 | Stop reason: SDUPTHER

## 2019-06-28 ASSESSMENT — PATIENT HEALTH QUESTIONNAIRE - PHQ9: CLINICAL INTERPRETATION OF PHQ2 SCORE: 0

## 2019-06-28 NOTE — ASSESSMENT & PLAN NOTE
"Patient reports sleep difficulty as long as he can remember.  Difficulty both with sleep induction and sleep maintenance, no improvement with melatonin, Benadryl, other over-the-counter sleep aids.  For the last several years he has been drinking alcohol in the evening when she feels this helped him fall asleep.  States he has 1 drink a night but a \"big\" drink-estimated 3 shots of alcohol at least.  He would like to reduce his alcohol use and is interested in prescription medication to help with his sleep  "

## 2019-06-28 NOTE — ASSESSMENT & PLAN NOTE
Intermittent episodes occurring over the last several years, increase in frequency the last week.  Pain typically occurs after exercise and is intense and associated with nausea, chills, sometimes vomiting which seems to provide some relief.  Described as burning, also states that he feels gassy.  Episodes usually last about an hour.  Typically occurs on an empty stomach, he has not noticed any exacerbation with eating or alcohol use.  He has tried antacids but has not found any relief with this.  This week he has had 3 episodes-twice after exercise and then a third occurring in the middle of the night which is unusual.  Denies any significant heartburn although he does mention he has had difficulty with chronic cough, this is actually been better in the last week.  No changes in bowel patterns including constipation, diarrhea, blood with bowel movements.  He does not take NSAIDs frequently.  No history of pancreatitis.  Last LFT in August 2018 normal

## 2019-06-28 NOTE — ASSESSMENT & PLAN NOTE
Uncontrolled in clinic with reading of 140/90, he reports similar Home readings although he has not been checking in recent months.  Consistently taking lisinopril 10 mg daily.  No reported chest pain, dizziness

## 2019-06-30 ENCOUNTER — PATIENT MESSAGE (OUTPATIENT)
Dept: MEDICAL GROUP | Facility: MEDICAL CENTER | Age: 49
End: 2019-06-30

## 2019-06-30 DIAGNOSIS — R74.8 ELEVATED LIVER ENZYMES: ICD-10-CM

## 2019-06-30 NOTE — TELEPHONE ENCOUNTER
From: Matt Boggs  To: VIET Joe  Sent: 6/30/2019 8:59 AM PDT  Subject: Non-Urgent Medical Question    Hi,  The ultrasound was scheduled for the 22nd. Earliest they had was the 18th. I'm out of town that week. Is there anyway we can get one sooner? I am not a fan of the liver results. :(

## 2019-07-01 ENCOUNTER — PATIENT MESSAGE (OUTPATIENT)
Dept: MEDICAL GROUP | Facility: MEDICAL CENTER | Age: 49
End: 2019-07-01

## 2019-07-01 ENCOUNTER — HOSPITAL ENCOUNTER (OUTPATIENT)
Dept: LAB | Facility: MEDICAL CENTER | Age: 49
End: 2019-07-01
Attending: NURSE PRACTITIONER
Payer: COMMERCIAL

## 2019-07-01 DIAGNOSIS — R74.8 ELEVATED LIVER ENZYMES: ICD-10-CM

## 2019-07-01 LAB
ALBUMIN SERPL BCP-MCNC: 4.3 G/DL (ref 3.2–4.9)
ALP SERPL-CCNC: 161 U/L (ref 30–99)
ALT SERPL-CCNC: 190 U/L (ref 2–50)
AST SERPL-CCNC: 67 U/L (ref 12–45)
BASOPHILS # BLD AUTO: 0.5 % (ref 0–1.8)
BASOPHILS # BLD: 0.03 K/UL (ref 0–0.12)
BILIRUB CONJ SERPL-MCNC: 0.1 MG/DL (ref 0.1–0.5)
BILIRUB INDIRECT SERPL-MCNC: 0.8 MG/DL (ref 0–1)
BILIRUB SERPL-MCNC: 0.9 MG/DL (ref 0.1–1.5)
EOSINOPHIL # BLD AUTO: 0.09 K/UL (ref 0–0.51)
EOSINOPHIL NFR BLD: 1.5 % (ref 0–6.9)
ERYTHROCYTE [DISTWIDTH] IN BLOOD BY AUTOMATED COUNT: 42.1 FL (ref 35.9–50)
HCT VFR BLD AUTO: 44.8 % (ref 42–52)
HGB BLD-MCNC: 14.6 G/DL (ref 14–18)
IMM GRANULOCYTES # BLD AUTO: 0.02 K/UL (ref 0–0.11)
IMM GRANULOCYTES NFR BLD AUTO: 0.3 % (ref 0–0.9)
IRON SATN MFR SERPL: 22 % (ref 15–55)
IRON SERPL-MCNC: 85 UG/DL (ref 50–180)
LYMPHOCYTES # BLD AUTO: 2.07 K/UL (ref 1–4.8)
LYMPHOCYTES NFR BLD: 33.5 % (ref 22–41)
MCH RBC QN AUTO: 31.4 PG (ref 27–33)
MCHC RBC AUTO-ENTMCNC: 32.6 G/DL (ref 33.7–35.3)
MCV RBC AUTO: 96.3 FL (ref 81.4–97.8)
MONOCYTES # BLD AUTO: 0.52 K/UL (ref 0–0.85)
MONOCYTES NFR BLD AUTO: 8.4 % (ref 0–13.4)
NEUTROPHILS # BLD AUTO: 3.45 K/UL (ref 1.82–7.42)
NEUTROPHILS NFR BLD: 55.8 % (ref 44–72)
NRBC # BLD AUTO: 0 K/UL
NRBC BLD-RTO: 0 /100 WBC
PLATELET # BLD AUTO: 212 K/UL (ref 164–446)
PMV BLD AUTO: 11.8 FL (ref 9–12.9)
PROT SERPL-MCNC: 7.7 G/DL (ref 6–8.2)
RBC # BLD AUTO: 4.65 M/UL (ref 4.7–6.1)
TIBC SERPL-MCNC: 388 UG/DL (ref 250–450)
WBC # BLD AUTO: 6.2 K/UL (ref 4.8–10.8)

## 2019-07-01 PROCEDURE — 80074 ACUTE HEPATITIS PANEL: CPT

## 2019-07-01 PROCEDURE — 83540 ASSAY OF IRON: CPT

## 2019-07-01 PROCEDURE — 85025 COMPLETE CBC W/AUTO DIFF WBC: CPT

## 2019-07-01 PROCEDURE — 83550 IRON BINDING TEST: CPT

## 2019-07-01 PROCEDURE — 86038 ANTINUCLEAR ANTIBODIES: CPT

## 2019-07-01 PROCEDURE — 36415 COLL VENOUS BLD VENIPUNCTURE: CPT

## 2019-07-01 PROCEDURE — 81256 HFE GENE: CPT

## 2019-07-01 PROCEDURE — 80076 HEPATIC FUNCTION PANEL: CPT

## 2019-07-01 NOTE — PATIENT COMMUNICATION
Please also inform patient of below my chart message  We will call and see if we can get an earlier appointment, we also need to do a few additional lab tests.  I have placed further orders to you can go in at your convenience

## 2019-07-01 NOTE — TELEPHONE ENCOUNTER
Called and spoke to the patient, notified him they did not have anything sooner, also told him that we can switch he order to a stat so he can do this sometime this week and patient is okay with that.

## 2019-07-02 ENCOUNTER — PATIENT MESSAGE (OUTPATIENT)
Dept: MEDICAL GROUP | Facility: MEDICAL CENTER | Age: 49
End: 2019-07-02

## 2019-07-02 ENCOUNTER — HOSPITAL ENCOUNTER (OUTPATIENT)
Dept: RADIOLOGY | Facility: MEDICAL CENTER | Age: 49
End: 2019-07-02
Attending: NURSE PRACTITIONER
Payer: COMMERCIAL

## 2019-07-02 DIAGNOSIS — R74.8 ELEVATED LIVER ENZYMES: ICD-10-CM

## 2019-07-02 DIAGNOSIS — Q45.3 PANCREATIC ABNORMALITY: ICD-10-CM

## 2019-07-02 LAB
HAV IGM SERPL QL IA: NEGATIVE
HBV CORE IGM SER QL: NEGATIVE
HBV SURFACE AG SER QL: NEGATIVE
HCV AB SER QL: NEGATIVE

## 2019-07-02 PROCEDURE — 76705 ECHO EXAM OF ABDOMEN: CPT

## 2019-07-02 NOTE — TELEPHONE ENCOUNTER
From: Matt Boggs  To: VIET Joe  Sent: 7/2/2019 2:33 PM PDT  Subject: Non-Urgent Medical Question    called the [facility] To make an appointment but I guess they haven't processed it yet. If I don't here from them tomorrow morning I call again.  ----- Message -----  From: VIET Joe  Sent: 7/2/19, 2:08 PM  To: Matt Boggs  Subject: RE: Non-Urgent Medical Question    The red blood cell abnormality is so minor that is not likely to be related or causing you any symptoms. I do think it is good that your liver enzymes are coming down. I am unsure of causes but do think you need further evaluation so I expect the gastroenterologist will order more imaging or tests. Let me know when you get scheduled with them!    ----- Message -----   From: Matt Boggs   Sent: 7/2/2019 1:15 PM PDT   To: VIET Joe  Subject: Non-Urgent Medical Question    I haven't had a pain episode since. I feel pretty good. I'll continue to avoid alcohol. Wondering about low red blood cell. Looks like liver levels trending toward normal. Referral to [facility] would be welcomed. Thank you. Any thoughts on possible causes?      ----- Message -----  From: VIET Joe  Sent: 7/2/19, 6:41 AM  To: Matt Boggs  Subject: RE: Non-Urgent Medical Question    Excellent!    ----- Message -----   From: Matt Boggs   Sent: 7/1/2019 3:12 PM PDT   To: VIET Joe  Subject: Non-Urgent Medical Question    Just had blood drawn and have an appointment tomorrow morning for ultrasound.

## 2019-07-02 NOTE — TELEPHONE ENCOUNTER
From: Matt Boggs  To: VIET Joe  Sent: 7/2/2019 1:15 PM PDT  Subject: Non-Urgent Medical Question    I haven't had a pain episode since. I feel pretty good. I'll continue to avoid alcohol. Wondering about low red blood cell. Looks like liver levels trending toward normal. Referral to [facility] would be welcomed. Thank you. Any thoughts on possible causes?      ----- Message -----  From: VIET Joe  Sent: 7/2/19, 6:41 AM  To: Matt Boggs  Subject: RE: Non-Urgent Medical Question    Excellent!    ----- Message -----   From: Matt Boggs   Sent: 7/1/2019 3:12 PM PDT   To: VIET Joe  Subject: Non-Urgent Medical Question    Just had blood drawn and have an appointment tomorrow morning for ultrasound.

## 2019-07-02 NOTE — TELEPHONE ENCOUNTER
From: Matt Boggs  To: VIET Joe  Sent: 7/1/2019 3:12 PM PDT  Subject: Non-Urgent Medical Question    Just had blood drawn and have an appointment tomorrow morning for ultrasound.

## 2019-07-03 LAB — NUCLEAR IGG SER QL IA: NORMAL

## 2019-07-05 LAB
HFE GENE MUT ANL BLD/T: NORMAL
HFE P.C282Y BLD/T QL: NEGATIVE
HFE P.H63D BLD/T QL: NEGATIVE
HFE P.S65C BLD/T QL: NEGATIVE

## 2019-07-14 DIAGNOSIS — F51.04 CHRONIC INSOMNIA: ICD-10-CM

## 2019-07-15 RX ORDER — TRAZODONE HYDROCHLORIDE 50 MG/1
50-150 TABLET ORAL NIGHTLY PRN
Qty: 90 TAB | Refills: 5 | Status: SHIPPED | OUTPATIENT
Start: 2019-07-15

## 2019-07-20 DIAGNOSIS — R10.10 UPPER ABDOMINAL PAIN OF UNKNOWN ETIOLOGY: ICD-10-CM

## 2019-07-22 RX ORDER — OMEPRAZOLE 20 MG/1
CAPSULE, DELAYED RELEASE ORAL
Qty: 30 CAP | Refills: 0 | Status: SHIPPED | OUTPATIENT
Start: 2019-07-22 | End: 2019-08-16 | Stop reason: SDUPTHER

## 2019-08-02 ENCOUNTER — HOSPITAL ENCOUNTER (OUTPATIENT)
Dept: RADIOLOGY | Facility: MEDICAL CENTER | Age: 49
End: 2019-08-02
Attending: PHYSICIAN ASSISTANT
Payer: COMMERCIAL

## 2019-08-02 DIAGNOSIS — R10.13 ABDOMINAL PAIN, EPIGASTRIC: ICD-10-CM

## 2019-08-02 DIAGNOSIS — R74.8 ACID PHOSPHATASE ELEVATED: ICD-10-CM

## 2019-08-02 DIAGNOSIS — R11.2 NAUSEA AND VOMITING, INTRACTABILITY OF VOMITING NOT SPECIFIED, UNSPECIFIED VOMITING TYPE: ICD-10-CM

## 2019-08-02 PROCEDURE — A9537 TC99M MEBROFENIN: HCPCS

## 2019-08-02 PROCEDURE — 74181 MRI ABDOMEN W/O CONTRAST: CPT

## 2019-08-16 DIAGNOSIS — R10.10 UPPER ABDOMINAL PAIN OF UNKNOWN ETIOLOGY: ICD-10-CM

## 2019-08-19 RX ORDER — OMEPRAZOLE 20 MG/1
CAPSULE, DELAYED RELEASE ORAL
Qty: 30 CAP | Refills: 5 | Status: SHIPPED | OUTPATIENT
Start: 2019-08-19 | End: 2021-08-13

## 2019-11-18 DIAGNOSIS — E78.5 HYPERLIPIDEMIA, UNSPECIFIED HYPERLIPIDEMIA TYPE: ICD-10-CM

## 2019-11-18 RX ORDER — SIMVASTATIN 10 MG
TABLET ORAL
Qty: 90 TAB | Refills: 1 | Status: SHIPPED | OUTPATIENT
Start: 2019-11-18 | End: 2020-05-13

## 2019-11-28 ENCOUNTER — PATIENT MESSAGE (OUTPATIENT)
Dept: MEDICAL GROUP | Facility: MEDICAL CENTER | Age: 49
End: 2019-11-28

## 2019-11-30 NOTE — TELEPHONE ENCOUNTER
From: Matt Boggs  To: VIET Joe  Sent: 11/28/2019 7:21 AM PST  Subject: Non-Urgent Medical Question    Ravi Borges,  Hope you're having a great Thanksgiving.  Seems I've come down with a bad cold or flu with a 101 fever, even though I had my flu shot. Is there an anti viral medication I can take to help get over it quicker. I'm currently 4 days in and it doesn't seem to be getting better.  Thanks,  Jose

## 2019-12-16 ENCOUNTER — PATIENT MESSAGE (OUTPATIENT)
Dept: MEDICAL GROUP | Facility: MEDICAL CENTER | Age: 49
End: 2019-12-16

## 2019-12-17 NOTE — TELEPHONE ENCOUNTER
From: Matt Boggs  To: VIET Joe  Sent: 12/16/2019 7:21 PM PST  Subject: Non-Urgent Medical Question    Ravi Borges,  I'm messaging you on behalf of my daughter Kalina. We are incredibly dissatisfied with Kalina's gi doctor, Jitendra Herbert. We wait months for tests and after months in his care we have no positive outcome. She hasn't been to school or out of the house in months. We're so frustrated with the lack of results. Is there someone you can refer us to maybe in [place] or At [facility]? We need to see some positive results soon.    Thanks,    Jose

## 2019-12-25 ENCOUNTER — PATIENT MESSAGE (OUTPATIENT)
Dept: MEDICAL GROUP | Facility: MEDICAL CENTER | Age: 49
End: 2019-12-25

## 2019-12-26 NOTE — TELEPHONE ENCOUNTER
From: Matt Boggs  To: VIET Joe  Sent: 12/25/2019 11:53 PM PST  Subject: Non-Urgent Medical Question    Hi Katerina,  Looks like Dr. Herbert is going to refer us to a DrAnthony at [place]. We'll keep you informed as to how things are progressing. Thank you for all of your efforts. I'll keep in touch.  Jose Pat  ----- Message -----  From: VIET Joe  Sent: 12/16/19, 7:45 PM  To: Matt Boggs  Subject: RE: Non-Urgent Medical Question    Ravi Garcia,  Can you please make an appointment for Kalina so we can go over all the records are discuss options?  VIET Joe      ----- Message -----   From: Matt Boggs   Sent: 12/16/2019 7:21 PM PST   To: VIET Joe  Subject: Non-Urgent Medical Question    Ravi Borges,  I'm messaging you on behalf of my daughter Kalina. We are incredibly dissatisfied with Kalina's gi doctor, Jitendra Herbert. We wait months for tests and after months in his care we have no positive outcome. She hasn't been to school or out of the house in months. We're so frustrated with the lack of results. Is there someone you can refer us to maybe in [place] or At [facility]? We need to see some positive results soon.    ThanksJose

## 2020-05-13 DIAGNOSIS — E78.5 HYPERLIPIDEMIA, UNSPECIFIED HYPERLIPIDEMIA TYPE: ICD-10-CM

## 2020-05-13 RX ORDER — SIMVASTATIN 10 MG
TABLET ORAL
Qty: 90 TAB | Refills: 1 | Status: SHIPPED | OUTPATIENT
Start: 2020-05-13 | End: 2020-11-06 | Stop reason: SDUPTHER

## 2020-06-23 DIAGNOSIS — E78.00 PURE HYPERCHOLESTEROLEMIA: ICD-10-CM

## 2020-06-23 DIAGNOSIS — R74.8 ELEVATED LIVER ENZYMES: ICD-10-CM

## 2020-06-23 DIAGNOSIS — E03.9 HYPOTHYROIDISM, UNSPECIFIED TYPE: ICD-10-CM

## 2020-06-23 DIAGNOSIS — I10 ESSENTIAL HYPERTENSION: ICD-10-CM

## 2020-06-23 RX ORDER — LEVOTHYROXINE SODIUM 0.1 MG/1
100 TABLET ORAL
Qty: 90 TAB | Refills: 0 | Status: SHIPPED | OUTPATIENT
Start: 2020-06-23 | End: 2021-08-13

## 2020-07-24 NOTE — PROGRESS NOTES
"Subjective:     Chief Complaint   Patient presents with   • GI Problem     stomach pain      Matt Boggs is a 48 y.o. male here today to follow up on:    Essential hypertension  Uncontrolled in clinic with reading of 140/90, he reports similar Home readings although he has not been checking in recent months.  Consistently taking lisinopril 10 mg daily.  No reported chest pain, dizziness    Chronic insomnia  Patient reports sleep difficulty as long as he can remember.  Difficulty both with sleep induction and sleep maintenance, no improvement with melatonin, Benadryl, other over-the-counter sleep aids.  For the last several years he has been drinking alcohol in the evening when she feels this helped him fall asleep.  States he has 1 drink a night but a \"big\" drink-estimated 3 shots of alcohol at least.  He would like to reduce his alcohol use and is interested in prescription medication to help with his sleep    Upper abdominal pain of unknown etiology  Intermittent episodes occurring over the last several years, increase in frequency the last week.  Pain typically occurs after exercise and is intense and associated with nausea, chills, sometimes vomiting which seems to provide some relief.  Described as burning, also states that he feels gassy.  Episodes usually last about an hour.  Typically occurs on an empty stomach, he has not noticed any exacerbation with eating or alcohol use.  He has tried antacids but has not found any relief with this.  This week he has had 3 episodes-twice after exercise and then a third occurring in the middle of the night which is unusual.  Denies any significant heartburn although he does mention he has had difficulty with chronic cough, this is actually been better in the last week.  No changes in bowel patterns including constipation, diarrhea, blood with bowel movements.  He does not take NSAIDs frequently.  No history of pancreatitis.  Last LFT in August 2018 normal.  No " Refer to MD.  Paperwork from pharmacy also given to MD.    fever though he does report chills       Current medicines (including changes today)  Current Outpatient Prescriptions   Medication Sig Dispense Refill   • omeprazole (PRILOSEC) 20 MG delayed-release capsule Take 1 Cap by mouth every day. 30 Cap 0   • traZODone (DESYREL) 50 MG Tab Take 1-3 Tabs by mouth at bedtime as needed for Sleep. 60 Tab 0   • levothyroxine (SYNTHROID) 100 MCG Tab Take 1 Tab by mouth Every morning on an empty stomach. 90 Tab 3   • lisinopril (PRINIVIL) 10 MG Tab Take 1 Tab by mouth every day. 90 Tab 1   • simvastatin (ZOCOR) 10 MG Tab Take 1 Tab by mouth every bedtime. TAKE 1 TAB BY MOUTH EVERY EVENING. 90 Tab 1     No current facility-administered medications for this visit.      He  has no past medical history on file.    ROS included above     Objective:     /90   Pulse (!) 52   Temp 36.9 °C (98.4 °F) (Temporal)   Resp 16   Ht 1.829 m (6')   Wt 106.6 kg (235 lb)   SpO2 98%  Body mass index is 31.87 kg/m².     Physical Exam:  General: Alert, oriented in no acute distress.  Eye contact is good, speech is normal, affect calm  HEENT: Oral mucosa pink moist, no lesions. TMs gray with good landmarks bilaterally. No lymphadenopathy.  Lungs: clear to auscultation bilaterally, normal effort, no wheeze/ rhonchi/ rales.  CV: regular rate and rhythm, S1, S2, no murmur  Abdomen: soft, nontender, no distention, no hepatosplenomegaly  Ext: no edema, color normal, vascularity normal, temperature normal    Assessment and Plan:   The following treatment plan was discussed   1. Upper abdominal pain of unknown etiology   intense upper abdominal pain typically occurring on an empty stomach and after exercise, feels some relief with vomiting.  This is occurred intermittently over the last several years but with increased frequency the last week-3 episodes this week.  Will evaluate labs and ultrasound as listed below, start trial of Prilosec, encouraged alcohol reduction.  May need referral to  gastroenterology pending results  Comp Metabolic Panel    CBC WITH DIFFERENTIAL    AMYLASE    LIPASE    US-RUQ    omeprazole (PRILOSEC) 20 MG delayed-release capsule   2. Chronic insomnia   long-standing difficulty, using alcohol to help with sleep induction.  He would like to cut back on his alcohol use and is interested in prescription medication options.  Will start trial of trazodone, discussed adjusting dose as needed.  I will follow-up with him next week.  Consider sleep study if difficulty managing his insomnia  traZODone (DESYREL) 50 MG Tab   3. Alcohol use   encouraged reduction/discontinuation of alcohol use, this may actually worsen his insomnia  Comp Metabolic Panel   4. Essential hypertension   uncontrolled, advised to increase of lisinopril to 20 mg daily over the next few days, monitor blood pressure.  I will speak with him early next week   5. Hypothyroidism, unspecified type  levothyroxine (SYNTHROID) 100 MCG Tab       Followup: Pending tests         Please note that this dictation was created using voice recognition software. I have worked with consultants from the vendor as well as technical experts from [employer] to optimize the interface. I have made every reasonable attempt to correct obvious errors, but I expect that there are errors of grammar and possibly content that I did not discover before finalizing the note.

## 2020-08-05 ENCOUNTER — PATIENT MESSAGE (OUTPATIENT)
Dept: MEDICAL GROUP | Facility: MEDICAL CENTER | Age: 50
End: 2020-08-05

## 2020-08-05 LAB
ALBUMIN SERPL-MCNC: 4.5 G/DL (ref 3.8–4.9)
ALBUMIN/GLOB SERPL: 1.9 {RATIO} (ref 1.2–2.2)
ALP SERPL-CCNC: 53 IU/L (ref 39–117)
ALT SERPL-CCNC: 21 IU/L (ref 0–44)
AST SERPL-CCNC: 28 IU/L (ref 0–40)
BILIRUB SERPL-MCNC: 1 MG/DL (ref 0–1.2)
BUN SERPL-MCNC: 15 MG/DL (ref 6–24)
BUN/CREAT SERPL: 11 (ref 9–20)
CALCIUM SERPL-MCNC: 9.5 MG/DL (ref 8.7–10.2)
CHLORIDE SERPL-SCNC: 100 MMOL/L (ref 96–106)
CHOLEST SERPL-MCNC: 210 MG/DL (ref 100–199)
CO2 SERPL-SCNC: 22 MMOL/L (ref 20–29)
CREAT SERPL-MCNC: 1.34 MG/DL (ref 0.76–1.27)
GLOBULIN SER CALC-MCNC: 2.4 G/DL (ref 1.5–4.5)
GLUCOSE SERPL-MCNC: 97 MG/DL (ref 65–99)
HDLC SERPL-MCNC: 55 MG/DL
LABORATORY COMMENT REPORT: ABNORMAL
LDLC SERPL CALC-MCNC: 131 MG/DL (ref 0–99)
POTASSIUM SERPL-SCNC: 4.5 MMOL/L (ref 3.5–5.2)
PROT SERPL-MCNC: 6.9 G/DL (ref 6–8.5)
SODIUM SERPL-SCNC: 139 MMOL/L (ref 134–144)
T4 FREE SERPL-MCNC: 1.31 NG/DL (ref 0.82–1.77)
TRIGL SERPL-MCNC: 119 MG/DL (ref 0–149)
TSH SERPL DL<=0.005 MIU/L-ACNC: 6 UIU/ML (ref 0.45–4.5)
VLDLC SERPL CALC-MCNC: 24 MG/DL (ref 5–40)

## 2020-08-06 RX ORDER — LEVOTHYROXINE SODIUM 112 UG/1
112 TABLET ORAL
Qty: 90 TAB | Refills: 3 | Status: SHIPPED | OUTPATIENT
Start: 2020-08-06 | End: 2021-04-30 | Stop reason: SDUPTHER

## 2020-08-09 ENCOUNTER — PATIENT MESSAGE (OUTPATIENT)
Dept: MEDICAL GROUP | Facility: MEDICAL CENTER | Age: 50
End: 2020-08-09

## 2020-08-11 RX ORDER — LISINOPRIL 20 MG/1
20 TABLET ORAL DAILY
Qty: 90 TAB | Refills: 3 | Status: SHIPPED | OUTPATIENT
Start: 2020-08-11 | End: 2021-04-30 | Stop reason: SDUPTHER

## 2020-08-11 NOTE — TELEPHONE ENCOUNTER
From: Matt Boggs  To: VIET Joe  Sent: 8/9/2020 1:57 PM PDT  Subject: Non-Urgent Medical Question    Ravi Borges. I'm running out of my lisinopril and currently don't have any refills left. Current bp is 117/59  Jose      ----- Message -----   From:VIET Joe   Sent:8/6/2020 9:41 AM PDT   To:Matt Boggs   Subject:RE: Non-Urgent Medical Question    Having your thyroid off can affect the cholesterol, so I would not be too worked up about that right now. Let us address the thyroid issue and then reevaluate. I will send in a higher dose.  Regarding the increase in your creatinine, be sure that you are staying hydrated. Avoid protein supplements.      ----- Message -----   From:Matt Boggs   Sent:8/5/2020 5:42 PM PDT   To:VIET Joe   Subject:Non-Urgent Medical Question    Ravi Borges,  I take my thyroid medication every morning right when I wake up and I don't eat for @ an hour. It may be that the dosage needs to be adjusted. Also creatinine is up a bit. I have been working out more so could that have something to do with it or could there be a kidney issue? LDL is up a bit also. I eat a pretty clean diet. Does that need an adjustment as well?

## 2020-09-03 ENCOUNTER — PATIENT MESSAGE (OUTPATIENT)
Dept: MEDICAL GROUP | Facility: MEDICAL CENTER | Age: 50
End: 2020-09-03

## 2020-09-23 ENCOUNTER — APPOINTMENT (OUTPATIENT)
Dept: MEDICAL GROUP | Facility: MEDICAL CENTER | Age: 50
End: 2020-09-23
Payer: COMMERCIAL

## 2020-11-06 DIAGNOSIS — E78.5 HYPERLIPIDEMIA, UNSPECIFIED HYPERLIPIDEMIA TYPE: ICD-10-CM

## 2020-11-06 RX ORDER — SIMVASTATIN 10 MG
10 TABLET ORAL
Qty: 90 TAB | Refills: 1 | Status: SHIPPED | OUTPATIENT
Start: 2020-11-06 | End: 2021-04-30

## 2020-11-06 NOTE — TELEPHONE ENCOUNTER
Received request via: Pharmacy    Was the patient seen in the last year in this department? No     Does the patient have an active prescription (recently filled or refills available) for medication(s) requested? Yes, runs through 11/9

## 2020-11-21 ENCOUNTER — PATIENT MESSAGE (OUTPATIENT)
Dept: MEDICAL GROUP | Facility: MEDICAL CENTER | Age: 50
End: 2020-11-21

## 2020-11-22 DIAGNOSIS — B34.9 VIRAL ILLNESS: ICD-10-CM

## 2020-11-23 ENCOUNTER — HOSPITAL ENCOUNTER (OUTPATIENT)
Dept: LAB | Facility: MEDICAL CENTER | Age: 50
End: 2020-11-23
Attending: NURSE PRACTITIONER
Payer: COMMERCIAL

## 2020-11-23 DIAGNOSIS — B34.9 VIRAL ILLNESS: ICD-10-CM

## 2020-11-23 PROCEDURE — C9803 HOPD COVID-19 SPEC COLLECT: HCPCS

## 2020-11-23 PROCEDURE — U0003 INFECTIOUS AGENT DETECTION BY NUCLEIC ACID (DNA OR RNA); SEVERE ACUTE RESPIRATORY SYNDROME CORONAVIRUS 2 (SARS-COV-2) (CORONAVIRUS DISEASE [COVID-19]), AMPLIFIED PROBE TECHNIQUE, MAKING USE OF HIGH THROUGHPUT TECHNOLOGIES AS DESCRIBED BY CMS-2020-01-R: HCPCS

## 2020-11-23 NOTE — TELEPHONE ENCOUNTER
From: Matt Boggs  To: VIET Joe  Sent: 11/21/2020 2:56 PM PST  Subject: Non-Urgent Medical Question    Hi Katerina,  Looks as if Sa[facility]ra tested positive for COVID 19. Also I have developed a cough and body aches so I'm fairly confident I have it as well. Symptoms are pretty mild so far. Should I still get tested? Kind of seems redundant at this point.

## 2020-11-24 LAB — COVID ORDER STATUS COVID19: NORMAL

## 2020-11-25 LAB
SARS-COV-2 RNA RESP QL NAA+PROBE: DETECTED
SPECIMEN SOURCE: ABNORMAL

## 2021-03-14 ENCOUNTER — PATIENT MESSAGE (OUTPATIENT)
Dept: MEDICAL GROUP | Facility: MEDICAL CENTER | Age: 51
End: 2021-03-14

## 2021-08-13 ENCOUNTER — OFFICE VISIT (OUTPATIENT)
Dept: MEDICAL GROUP | Facility: MEDICAL CENTER | Age: 51
End: 2021-08-13
Payer: COMMERCIAL

## 2021-08-13 VITALS
WEIGHT: 239.86 LBS | TEMPERATURE: 97.1 F | RESPIRATION RATE: 18 BRPM | BODY MASS INDEX: 30.78 KG/M2 | HEIGHT: 74 IN | HEART RATE: 57 BPM | OXYGEN SATURATION: 98 % | DIASTOLIC BLOOD PRESSURE: 86 MMHG | SYSTOLIC BLOOD PRESSURE: 124 MMHG

## 2021-08-13 DIAGNOSIS — E03.9 ACQUIRED HYPOTHYROIDISM: ICD-10-CM

## 2021-08-13 DIAGNOSIS — E78.5 HYPERLIPIDEMIA, UNSPECIFIED HYPERLIPIDEMIA TYPE: ICD-10-CM

## 2021-08-13 DIAGNOSIS — Z13.29 SCREENING FOR ENDOCRINE DISORDER: ICD-10-CM

## 2021-08-13 DIAGNOSIS — Z12.11 COLON CANCER SCREENING: ICD-10-CM

## 2021-08-13 DIAGNOSIS — I10 ESSENTIAL HYPERTENSION: ICD-10-CM

## 2021-08-13 DIAGNOSIS — Z00.00 ANNUAL PHYSICAL EXAM: ICD-10-CM

## 2021-08-13 DIAGNOSIS — Z13.220 LIPID SCREENING: ICD-10-CM

## 2021-08-13 DIAGNOSIS — Z13.0 SCREENING FOR DEFICIENCY ANEMIA: ICD-10-CM

## 2021-08-13 DIAGNOSIS — Z13.21 ENCOUNTER FOR VITAMIN DEFICIENCY SCREENING: ICD-10-CM

## 2021-08-13 PROCEDURE — 99396 PREV VISIT EST AGE 40-64: CPT | Performed by: NURSE PRACTITIONER

## 2021-08-13 RX ORDER — LISINOPRIL 20 MG/1
20 TABLET ORAL DAILY
Qty: 90 TABLET | Refills: 0 | Status: SHIPPED | OUTPATIENT
Start: 2021-08-13 | End: 2021-08-23 | Stop reason: SDUPTHER

## 2021-08-13 RX ORDER — SIMVASTATIN 10 MG
10 TABLET ORAL EVERY EVENING
Qty: 90 TABLET | Refills: 3 | Status: SHIPPED | OUTPATIENT
Start: 2021-08-13 | End: 2021-08-23

## 2021-08-13 RX ORDER — LEVOTHYROXINE SODIUM 112 UG/1
112 TABLET ORAL
Qty: 90 TABLET | Refills: 3 | Status: SHIPPED | OUTPATIENT
Start: 2021-08-13

## 2021-08-13 ASSESSMENT — PATIENT HEALTH QUESTIONNAIRE - PHQ9: CLINICAL INTERPRETATION OF PHQ2 SCORE: 0

## 2021-08-13 NOTE — PATIENT INSTRUCTIONS
[facility] [phone]  [facility] [phone]  [facility] [phone]    [email or web address]     [employer] on the floor FB group

## 2021-08-13 NOTE — ASSESSMENT & PLAN NOTE
Blood pressures controlled on lisinopril 20 mg daily.  Continues exercising regularly.  No complaints of chest pain, activity intolerance, palpitations

## 2021-08-13 NOTE — PROGRESS NOTES
"Subjective:     Chief Complaint   Patient presents with   • Annual Wellness Visit     Matt Boggs is a 51 y.o. male here for annual.  He has had some family stress over the last year with daughter being diagnosed bipolar and in-laws passing away.  Does not feel that he needs any medication treatment for mood but is interested in counseling    Hypothyroidism  Chronic issue managed with levothyroxine 112 mcg daily, dose increase following labs last year at which time his TSH was elevated at 6.0.  Denies concerns with chronic fatigue, constipation, heat or cold intolerance    Essential hypertension  Blood pressures controlled on lisinopril 20 mg daily.  Continues exercising regularly.  No complaints of chest pain, activity intolerance, palpitations    Hyperlipidemia  Consistent with simvastatin 10 mg daily.  Following healthy diet       Current medicines (including changes today)  Current Outpatient Medications   Medication Sig Dispense Refill   • levothyroxine (SYNTHROID) 112 MCG Tab Take 1 Tablet by mouth every morning on an empty stomach. 90 Tablet 3   • lisinopril (PRINIVIL) 20 MG Tab Take 1 Tablet by mouth every day. 90 Tablet 0   • simvastatin (ZOCOR) 10 MG Tab Take 1 Tablet by mouth every evening. 90 Tablet 3   • traZODone (DESYREL) 50 MG Tab TAKE 1-3 TABS BY MOUTH AT BEDTIME AS NEEDED FOR SLEEP. 90 Tab 5     No current facility-administered medications for this visit.     He  has no past medical history on file.    ROS included above     Objective:     /86 (BP Location: Left arm, Patient Position: Sitting, BP Cuff Size: Adult)   Pulse (!) 57   Temp 36.2 °C (97.1 °F) (Temporal)   Resp 18   Ht 1.87 m (6' 1.62\")   Wt 109 kg (239 lb 13.8 oz)   SpO2 98%  Body mass index is 31.11 kg/m².     Physical Exam:  General: Alert, oriented in no acute distress.  Eye contact is good, speech is normal, affect calm  HEENT:  TMs gray with good landmarks bilaterally. No lymphadenopathy.  Lungs: clear to " Recorded as Task  Date: 07/11/2017 02:53 PM, Created By: Asiya Ernandez  Task Name: 3. Referral Request  Assigned To: DENNYS RIVERA  Regarding Patient: KASI HYMAN, Status: Active  Comment:   Asiya Ernandez - 11 Jul 2017 2:53 PM    TASK CREATED  Homemaker  Maribel Bernstein - 11 Jul 2017 4:25 PM    TASK REPLIED TO: Previously Assigned To Maribel Bernstein this is not for DME please see the referral coordinator specialist within in your office...  Asiya Ernandez - 11 Jul 2017 5:02 PM    TASK REPLIED TO: Previously Assigned To DENNYS RIVERA    Order for  is corrected  Asiya Ernandez - 11 Jul 2017 5:02 PM    TASK COMPLETED  Anel Silva - 14 Jul 2017 9:55 AM    TASK REACTIVATED  Anel Silva - 14 Jul 2017 9:56 AM    TASK EDITED  John from [facility] needs clarification on referral for homemaker needs, please  call back @ [phone]    pt following with physician at home who also placed order for homemaker, meals on wheels, motorized scooter. homemaker referral from our office was not needed and will not be processed.      Electronically signed by:DENNYS RIVERA M.D.  Jul 14 2017 11:55AM CST     auscultation bilaterally, normal effort, no wheeze/ rhonchi/ rales.  CV: regular rate and rhythm, S1, S2, no murmur  Abdomen: soft, nontender, no hepatosplenomegaly  Ext: no edema, color normal, vascularity normal, temperature normal    Assessment and Plan:   The following treatment plan was discussed   1. Annual physical exam   normal exam.  General health and wellness discussion including healthy diet, regular exercise.  Phone numbers provided for counseling resources  Comp Metabolic Panel    CBC WITH DIFFERENTIAL   2. Acquired hypothyroidism   clinically euthyroid, dose was adjusted last year.  We will recheck labs  levothyroxine (SYNTHROID) 112 MCG Tab   3. Essential hypertension   stable, continue current medication  lisinopril (PRINIVIL) 20 MG Tab             4. Screening for deficiency anemia  CBC WITH DIFFERENTIAL   5. Encounter for vitamin deficiency screening  VITAMIN D,25 HYDROXY   6. Hyperlipidemia, unspecified hyperlipidemia type   consistent with simvastatin  Lipid Profile    simvastatin (ZOCOR) 10 MG Tab   7. Colon cancer screening  REFERRAL TO GASTROENTEROLOGY   8. Screening for endocrine disorder  TESTOSTERONE SERUM       Followup: pending labs        Please note that this dictation was created using voice recognition software. I have worked with consultants from the vendor as well as technical experts from [employer] to optimize the interface. I have made every reasonable attempt to correct obvious errors, but I expect that there are errors of grammar and possibly content that I did not discover before finalizing the note.

## 2021-08-13 NOTE — ASSESSMENT & PLAN NOTE
Chronic issue managed with levothyroxine 112 mcg daily, dose increase following labs last year at which time his TSH was elevated at 6.0.  Denies concerns with chronic fatigue, constipation, heat or cold intolerance

## 2021-08-20 ENCOUNTER — HOSPITAL ENCOUNTER (OUTPATIENT)
Dept: LAB | Facility: MEDICAL CENTER | Age: 51
End: 2021-08-20
Attending: NURSE PRACTITIONER
Payer: COMMERCIAL

## 2021-08-20 DIAGNOSIS — I10 ESSENTIAL HYPERTENSION: ICD-10-CM

## 2021-08-20 DIAGNOSIS — Z13.21 ENCOUNTER FOR VITAMIN DEFICIENCY SCREENING: ICD-10-CM

## 2021-08-20 DIAGNOSIS — E03.9 ACQUIRED HYPOTHYROIDISM: ICD-10-CM

## 2021-08-20 DIAGNOSIS — Z00.00 ANNUAL PHYSICAL EXAM: ICD-10-CM

## 2021-08-20 DIAGNOSIS — E78.5 HYPERLIPIDEMIA, UNSPECIFIED HYPERLIPIDEMIA TYPE: ICD-10-CM

## 2021-08-20 DIAGNOSIS — Z13.29 SCREENING FOR ENDOCRINE DISORDER: ICD-10-CM

## 2021-08-20 DIAGNOSIS — Z13.220 LIPID SCREENING: ICD-10-CM

## 2021-08-20 DIAGNOSIS — Z13.0 SCREENING FOR DEFICIENCY ANEMIA: ICD-10-CM

## 2021-08-20 LAB
25(OH)D3 SERPL-MCNC: 25 NG/ML (ref 30–100)
ALBUMIN SERPL BCP-MCNC: 4.2 G/DL (ref 3.2–4.9)
ALBUMIN/GLOB SERPL: 1.4 G/DL
ALP SERPL-CCNC: 62 U/L (ref 30–99)
ALT SERPL-CCNC: 22 U/L (ref 2–50)
ANION GAP SERPL CALC-SCNC: 11 MMOL/L (ref 7–16)
AST SERPL-CCNC: 22 U/L (ref 12–45)
BASOPHILS # BLD AUTO: 0.6 % (ref 0–1.8)
BASOPHILS # BLD: 0.04 K/UL (ref 0–0.12)
BILIRUB SERPL-MCNC: 1.1 MG/DL (ref 0.1–1.5)
BUN SERPL-MCNC: 16 MG/DL (ref 8–22)
CALCIUM SERPL-MCNC: 9.4 MG/DL (ref 8.5–10.5)
CHLORIDE SERPL-SCNC: 102 MMOL/L (ref 96–112)
CHOLEST SERPL-MCNC: 221 MG/DL (ref 100–199)
CO2 SERPL-SCNC: 24 MMOL/L (ref 20–33)
CREAT SERPL-MCNC: 1.21 MG/DL (ref 0.5–1.4)
EOSINOPHIL # BLD AUTO: 0.13 K/UL (ref 0–0.51)
EOSINOPHIL NFR BLD: 2 % (ref 0–6.9)
ERYTHROCYTE [DISTWIDTH] IN BLOOD BY AUTOMATED COUNT: 41.7 FL (ref 35.9–50)
FASTING STATUS PATIENT QL REPORTED: NORMAL
GLOBULIN SER CALC-MCNC: 3 G/DL (ref 1.9–3.5)
GLUCOSE SERPL-MCNC: 97 MG/DL (ref 65–99)
HCT VFR BLD AUTO: 44.9 % (ref 42–52)
HDLC SERPL-MCNC: 56 MG/DL
HGB BLD-MCNC: 15.3 G/DL (ref 14–18)
IMM GRANULOCYTES # BLD AUTO: 0.02 K/UL (ref 0–0.11)
IMM GRANULOCYTES NFR BLD AUTO: 0.3 % (ref 0–0.9)
LDLC SERPL CALC-MCNC: 141 MG/DL
LYMPHOCYTES # BLD AUTO: 2.4 K/UL (ref 1–4.8)
LYMPHOCYTES NFR BLD: 37.4 % (ref 22–41)
MCH RBC QN AUTO: 32.7 PG (ref 27–33)
MCHC RBC AUTO-ENTMCNC: 34.1 G/DL (ref 33.7–35.3)
MCV RBC AUTO: 95.9 FL (ref 81.4–97.8)
MONOCYTES # BLD AUTO: 0.51 K/UL (ref 0–0.85)
MONOCYTES NFR BLD AUTO: 7.9 % (ref 0–13.4)
NEUTROPHILS # BLD AUTO: 3.32 K/UL (ref 1.82–7.42)
NEUTROPHILS NFR BLD: 51.8 % (ref 44–72)
NRBC # BLD AUTO: 0 K/UL
NRBC BLD-RTO: 0 /100 WBC
PLATELET # BLD AUTO: 257 K/UL (ref 164–446)
PMV BLD AUTO: 12 FL (ref 9–12.9)
POTASSIUM SERPL-SCNC: 4 MMOL/L (ref 3.6–5.5)
PROT SERPL-MCNC: 7.2 G/DL (ref 6–8.2)
RBC # BLD AUTO: 4.68 M/UL (ref 4.7–6.1)
SODIUM SERPL-SCNC: 137 MMOL/L (ref 135–145)
TESTOST SERPL-MCNC: 333 NG/DL (ref 175–781)
TRIGL SERPL-MCNC: 120 MG/DL (ref 0–149)
TSH SERPL DL<=0.005 MIU/L-ACNC: 3.96 UIU/ML (ref 0.38–5.33)
WBC # BLD AUTO: 6.4 K/UL (ref 4.8–10.8)

## 2021-08-20 PROCEDURE — 84403 ASSAY OF TOTAL TESTOSTERONE: CPT

## 2021-08-20 PROCEDURE — 84443 ASSAY THYROID STIM HORMONE: CPT

## 2021-08-20 PROCEDURE — 82306 VITAMIN D 25 HYDROXY: CPT

## 2021-08-20 PROCEDURE — 80061 LIPID PANEL: CPT

## 2021-08-20 PROCEDURE — 80053 COMPREHEN METABOLIC PANEL: CPT

## 2021-08-20 PROCEDURE — 36415 COLL VENOUS BLD VENIPUNCTURE: CPT

## 2021-08-20 PROCEDURE — 85025 COMPLETE CBC W/AUTO DIFF WBC: CPT

## 2021-08-23 ENCOUNTER — PATIENT MESSAGE (OUTPATIENT)
Dept: MEDICAL GROUP | Facility: MEDICAL CENTER | Age: 51
End: 2021-08-23

## 2021-08-23 DIAGNOSIS — E78.00 PURE HYPERCHOLESTEROLEMIA: ICD-10-CM

## 2021-08-23 DIAGNOSIS — I10 ESSENTIAL HYPERTENSION: ICD-10-CM

## 2021-08-23 RX ORDER — SIMVASTATIN 80 MG
80 TABLET ORAL
Qty: 90 TABLET | Refills: 3 | Status: SHIPPED | OUTPATIENT
Start: 2021-08-23 | End: 2021-11-21

## 2021-08-23 RX ORDER — LISINOPRIL 20 MG/1
20 TABLET ORAL DAILY
Qty: 90 TABLET | Refills: 3 | Status: SHIPPED | OUTPATIENT
Start: 2021-08-23

## 2021-08-28 ENCOUNTER — PATIENT MESSAGE (OUTPATIENT)
Dept: MEDICAL GROUP | Facility: MEDICAL CENTER | Age: 51
End: 2021-08-28

## 2021-08-28 DIAGNOSIS — E78.00 PURE HYPERCHOLESTEROLEMIA: ICD-10-CM

## 2021-09-08 ENCOUNTER — HOSPITAL ENCOUNTER (OUTPATIENT)
Dept: RADIOLOGY | Facility: MEDICAL CENTER | Age: 51
End: 2021-09-08
Attending: NURSE PRACTITIONER
Payer: COMMERCIAL

## 2021-09-08 DIAGNOSIS — E78.00 PURE HYPERCHOLESTEROLEMIA: ICD-10-CM

## 2021-09-08 PROCEDURE — 4410556 CT-CARDIAC SCORING (SELF PAY ONLY)

## 2022-09-29 ENCOUNTER — TELEPHONE (OUTPATIENT)
Dept: MEDICAL GROUP | Facility: MEDICAL CENTER | Age: 52
End: 2022-09-29
Payer: COMMERCIAL

## 2025-06-09 ENCOUNTER — OFFICE VISIT (OUTPATIENT)
Dept: URGENT CARE | Facility: CLINIC | Age: 55
End: 2025-06-09
Payer: COMMERCIAL

## 2025-06-09 VITALS
HEART RATE: 76 BPM | DIASTOLIC BLOOD PRESSURE: 84 MMHG | WEIGHT: 234 LBS | HEIGHT: 73 IN | OXYGEN SATURATION: 96 % | TEMPERATURE: 98.2 F | BODY MASS INDEX: 31.01 KG/M2 | RESPIRATION RATE: 16 BRPM | SYSTOLIC BLOOD PRESSURE: 140 MMHG

## 2025-06-09 DIAGNOSIS — Z23 NEED FOR TETANUS BOOSTER: ICD-10-CM

## 2025-06-09 DIAGNOSIS — S61.211A LACERATION OF LEFT INDEX FINGER WITHOUT FOREIGN BODY, NAIL DAMAGE STATUS UNSPECIFIED, INITIAL ENCOUNTER: Primary | ICD-10-CM

## 2025-06-09 PROCEDURE — 3077F SYST BP >= 140 MM HG: CPT | Performed by: NURSE PRACTITIONER

## 2025-06-09 PROCEDURE — 3079F DIAST BP 80-89 MM HG: CPT | Performed by: NURSE PRACTITIONER

## 2025-06-09 PROCEDURE — 90471 IMMUNIZATION ADMIN: CPT | Performed by: NURSE PRACTITIONER

## 2025-06-09 PROCEDURE — 12002 RPR S/N/AX/GEN/TRNK2.6-7.5CM: CPT | Performed by: NURSE PRACTITIONER

## 2025-06-09 PROCEDURE — 90715 TDAP VACCINE 7 YRS/> IM: CPT | Performed by: NURSE PRACTITIONER

## 2025-06-09 RX ORDER — FLUOXETINE 10 MG/1
CAPSULE ORAL
COMMUNITY

## 2025-06-09 RX ORDER — CEPHALEXIN 500 MG/1
500 CAPSULE ORAL 4 TIMES DAILY
Qty: 20 CAPSULE | Refills: 0 | Status: SHIPPED | OUTPATIENT
Start: 2025-06-09 | End: 2025-06-14

## 2025-06-09 RX ORDER — SIMVASTATIN 80 MG
80 TABLET ORAL
COMMUNITY

## 2025-06-10 NOTE — PROGRESS NOTES
"Matt Boggs is a 54 y.o. male who presents for Laceration (X1 day, left hand, pointer finger. Blade laceration)      HPI  This is a new problem. Matt Boggs is a 54 y.o. patient who presents to urgent care with c/o: He was using an X-Acto knife and the blade slipped and cut his left index finger.  Injury occurred approximately 30 minutes prior to arrival.  Tdap unknown.    ROS See HPI    Allergies:     Allergies[1]    PMSFS Hx:  Past Medical History[2]  Past Surgical History[3]  Family History   Problem Relation Age of Onset    Hypertension Mother     Hypertension Father     Cancer Father         prostate cancer    Other Sister         lupus    Heart Disease Sister         MI- stated doesn't take care of self    Stroke Neg Hx     Diabetes Neg Hx      Social History     Tobacco Use    Smoking status: Never    Smokeless tobacco: Current    Tobacco comments:     Cigar   Substance Use Topics    Alcohol use: Yes     Comment: 1 drink nightly         Problems:   Problem List[4]    Medications:   Medications Ordered Prior to Encounter[5]     Objective:     BP (!) 140/84 (BP Location: Left arm, Patient Position: Sitting, BP Cuff Size: Adult)   Pulse 76   Temp 36.8 °C (98.2 °F) (Temporal)   Resp 16   Ht 1.854 m (6' 1\")   Wt 106 kg (234 lb)   SpO2 96%   BMI 30.87 kg/m²     Physical Exam      Procedure: Laceration Repair   -Risks benefits and alternatives discussed including bleeding, nerve damage, infection, and poor cosmetic outcome discussed at length. Benefits and alternatives discussed. Consent was obtained for repair of laceration    Wound length 3 cm, location left index finger  straight laceration, subcut tissue visible   Wound NVI, No signs of tendon injury   Area extensively irrigated with NS, wound explored, No fb identified.   Under clean conditions, I injected  2  cc of   1 % lidocaine. Good anesthesia was obtained. Under sterile conditions, I applied 6 sutures with 5.0 ethilon " interrupted sutures with good wound edge approximation.  Td vaccination indicated and given today   Bleeding was controlled. There were no procedural complications. Patient tolerated procedure well. Dressing was applied and wound care/follow up instructions were given.  Keep clean and dry for 24 hours then clean daily with mild soap and water. Return for s/s of infections ( swelling, pain, redness, pus, fever)   Suture removal 9-10 days    Patients questions were answered.    Assessment /Associated Orders:      1. Laceration of left index finger without foreign body, nail damage status unspecified, initial encounter  Tdap =>8yo IM    cephALEXin (KEFLEX) 500 MG Cap      2. Need for tetanus booster  Tdap =>8yo IM    cephALEXin (KEFLEX) 500 MG Cap          Medical Decision Making:    Matt Boggs is a very pleasant 54 y.o. male who is clinically stable at today's acute urgent care visit. Presents with acute problem/ concern today.    No acute distress is noted at the time of the visit.  VSS. Appropriate for outpatient care at this time.      Educated in on going wound care at home. The patient is alerted to watch for any signs of infection (redness, pus, pain, increased swelling or fever) and call if such occurs.   Educated in proper administration of  prescription medication(s) ordered today including safety, possible SE, risks, benefits, rationale and alternatives to therapy.   SR 9-10 days     Questions were encouraged and answered     Follow Up:   Return to urgent care prn if new or worsening sx or if there is no improvement in condition prn.           Please note that this dictation was created using voice recognition software. I have worked with consultants from the vendor as well as technical experts from [facility] [employer] to optimize the interface. I have made every reasonable attempt to correct obvious errors, but I expect that there are errors of grammar and possibly content that I did not discover  before finalizing the note.  This note was electronically signed by provider           [1] No Known Allergies  [2] History reviewed. No pertinent past medical history.  [3]   Past Surgical History:  Procedure Laterality Date    ANAL FISTULECTOMY      in his 20's   [4]   Patient Active Problem List  Diagnosis    Hypothyroidism    Essential hypertension    Psoriasis of nail    Hyperlipidemia    Obesity (BMI 30-39.9)    Preventative health care    Hyperhidrosis    Chronic bilateral low back pain without sciatica    Upper abdominal pain of unknown etiology    Chronic insomnia   [5]   Current Outpatient Medications on File Prior to Visit   Medication Sig Dispense Refill    simvastatin (ZOCOR) 80 MG tablet Take 80 mg by mouth at bedtime.      FLUoxetine (PROZAC) 10 MG Cap TAKE 1 CAPSULE BY MOUTH EVERY DAY for 90      lisinopril (PRINIVIL) 20 MG Tab Take 1 Tablet by mouth every day. 90 Tablet 3    levothyroxine (SYNTHROID) 112 MCG Tab Take 1 Tablet by mouth every morning on an empty stomach. 90 Tablet 3     No current facility-administered medications on file prior to visit.